# Patient Record
Sex: MALE | Race: WHITE | NOT HISPANIC OR LATINO | URBAN - METROPOLITAN AREA
[De-identification: names, ages, dates, MRNs, and addresses within clinical notes are randomized per-mention and may not be internally consistent; named-entity substitution may affect disease eponyms.]

---

## 2017-06-23 ENCOUNTER — HOSPITAL ENCOUNTER (INPATIENT)
Facility: HOSPITAL | Age: 27
LOS: 3 days | Discharge: HOME | End: 2017-06-26
Attending: INTERNAL MEDICINE | Admitting: INTERNAL MEDICINE

## 2017-06-23 ENCOUNTER — APPOINTMENT (OUTPATIENT)
Dept: GENERAL RADIOLOGY | Facility: HOSPITAL | Age: 27
End: 2017-06-23

## 2017-06-23 PROBLEM — N17.9 AKI (ACUTE KIDNEY INJURY) (HCC): Status: ACTIVE | Noted: 2017-06-23

## 2017-06-23 PROBLEM — I16.1 HYPERTENSIVE EMERGENCY: Status: ACTIVE | Noted: 2017-06-23

## 2017-06-23 LAB
ALBUMIN SERPL-MCNC: 4.1 G/DL (ref 3.2–4.8)
ALBUMIN/GLOB SERPL: 1.2 G/DL (ref 1.5–2.5)
ALP SERPL-CCNC: 66 U/L (ref 25–100)
ALT SERPL W P-5'-P-CCNC: 19 U/L (ref 7–40)
ANION GAP SERPL CALCULATED.3IONS-SCNC: 13 MMOL/L (ref 3–11)
ANION GAP SERPL CALCULATED.3IONS-SCNC: 13 MMOL/L (ref 3–11)
AST SERPL-CCNC: 23 U/L (ref 0–33)
BACTERIA UR QL AUTO: ABNORMAL /HPF
BASOPHILS # BLD AUTO: 0.02 10*3/MM3 (ref 0–0.2)
BASOPHILS # BLD AUTO: 0.02 10*3/MM3 (ref 0–0.2)
BASOPHILS NFR BLD AUTO: 0.1 % (ref 0–1)
BASOPHILS NFR BLD AUTO: 0.1 % (ref 0–1)
BILIRUB SERPL-MCNC: 0.6 MG/DL (ref 0.3–1.2)
BILIRUB UR QL STRIP: NEGATIVE
BUN BLD-MCNC: 21 MG/DL (ref 9–23)
BUN BLD-MCNC: 25 MG/DL (ref 9–23)
BUN/CREAT SERPL: 7.2 (ref 7–25)
BUN/CREAT SERPL: 8.9 (ref 7–25)
CALCIUM SPEC-SCNC: 8.7 MG/DL (ref 8.7–10.4)
CALCIUM SPEC-SCNC: 9.3 MG/DL (ref 8.7–10.4)
CHLORIDE SERPL-SCNC: 106 MMOL/L (ref 99–109)
CHLORIDE SERPL-SCNC: 108 MMOL/L (ref 99–109)
CK MB SERPL-CCNC: 1.7 NG/ML (ref 0–5)
CK SERPL-CCNC: 88 U/L (ref 26–174)
CLARITY UR: CLEAR
CO2 SERPL-SCNC: 18 MMOL/L (ref 20–31)
CO2 SERPL-SCNC: 20 MMOL/L (ref 20–31)
COLOR UR: YELLOW
CREAT BLD-MCNC: 2.8 MG/DL (ref 0.6–1.3)
CREAT BLD-MCNC: 2.9 MG/DL (ref 0.6–1.3)
DEPRECATED RDW RBC AUTO: 42.2 FL (ref 37–54)
DEPRECATED RDW RBC AUTO: 42.4 FL (ref 37–54)
EOSINOPHIL # BLD AUTO: 0 10*3/MM3 (ref 0.1–0.3)
EOSINOPHIL # BLD AUTO: 0.01 10*3/MM3 (ref 0.1–0.3)
EOSINOPHIL NFR BLD AUTO: 0 % (ref 0–3)
EOSINOPHIL NFR BLD AUTO: 0.1 % (ref 0–3)
ERYTHROCYTE [DISTWIDTH] IN BLOOD BY AUTOMATED COUNT: 13.7 % (ref 11.3–14.5)
ERYTHROCYTE [DISTWIDTH] IN BLOOD BY AUTOMATED COUNT: 13.7 % (ref 11.3–14.5)
GFR SERPL CREATININE-BSD FRML MDRD: 26 ML/MIN/1.73
GFR SERPL CREATININE-BSD FRML MDRD: 27 ML/MIN/1.73
GLOBULIN UR ELPH-MCNC: 3.4 GM/DL
GLUCOSE BLD-MCNC: 109 MG/DL (ref 70–100)
GLUCOSE BLD-MCNC: 159 MG/DL (ref 70–100)
GLUCOSE UR STRIP-MCNC: NEGATIVE MG/DL
HBA1C MFR BLD: 5.4 % (ref 4.8–5.6)
HCT VFR BLD AUTO: 43.3 % (ref 38.9–50.9)
HCT VFR BLD AUTO: 43.7 % (ref 38.9–50.9)
HGB BLD-MCNC: 14.7 G/DL (ref 13.1–17.5)
HGB BLD-MCNC: 15.4 G/DL (ref 13.1–17.5)
HGB UR QL STRIP.AUTO: ABNORMAL
HYALINE CASTS UR QL AUTO: ABNORMAL /LPF
IMM GRANULOCYTES # BLD: 0.03 10*3/MM3 (ref 0–0.03)
IMM GRANULOCYTES # BLD: 0.05 10*3/MM3 (ref 0–0.03)
IMM GRANULOCYTES NFR BLD: 0.2 % (ref 0–0.6)
IMM GRANULOCYTES NFR BLD: 0.3 % (ref 0–0.6)
KETONES UR QL STRIP: NEGATIVE
LEUKOCYTE ESTERASE UR QL STRIP.AUTO: NEGATIVE
LYMPHOCYTES # BLD AUTO: 0.78 10*3/MM3 (ref 0.6–4.8)
LYMPHOCYTES # BLD AUTO: 0.97 10*3/MM3 (ref 0.6–4.8)
LYMPHOCYTES NFR BLD AUTO: 4.9 % (ref 24–44)
LYMPHOCYTES NFR BLD AUTO: 6.9 % (ref 24–44)
MCH RBC QN AUTO: 28.7 PG (ref 27–31)
MCH RBC QN AUTO: 29.6 PG (ref 27–31)
MCHC RBC AUTO-ENTMCNC: 33.9 G/DL (ref 32–36)
MCHC RBC AUTO-ENTMCNC: 35.2 G/DL (ref 32–36)
MCV RBC AUTO: 83.9 FL (ref 80–99)
MCV RBC AUTO: 84.6 FL (ref 80–99)
MONOCYTES # BLD AUTO: 1.12 10*3/MM3 (ref 0–1)
MONOCYTES # BLD AUTO: 1.53 10*3/MM3 (ref 0–1)
MONOCYTES NFR BLD AUTO: 8 % (ref 0–12)
MONOCYTES NFR BLD AUTO: 9.6 % (ref 0–12)
MYOGLOBIN SERPL-MCNC: 96 NG/ML (ref 3–110)
NEUTROPHILS # BLD AUTO: 11.93 10*3/MM3 (ref 1.5–8.3)
NEUTROPHILS # BLD AUTO: 13.52 10*3/MM3 (ref 1.5–8.3)
NEUTROPHILS NFR BLD AUTO: 84.8 % (ref 41–71)
NEUTROPHILS NFR BLD AUTO: 85 % (ref 41–71)
NITRITE UR QL STRIP: NEGATIVE
PH UR STRIP.AUTO: 6 [PH] (ref 5–8)
PLATELET # BLD AUTO: 219 10*3/MM3 (ref 150–450)
PLATELET # BLD AUTO: 221 10*3/MM3 (ref 150–450)
PMV BLD AUTO: 9.7 FL (ref 6–12)
PMV BLD AUTO: 9.8 FL (ref 6–12)
POTASSIUM BLD-SCNC: 3 MMOL/L (ref 3.5–5.5)
POTASSIUM BLD-SCNC: 3.1 MMOL/L (ref 3.5–5.5)
PROT SERPL-MCNC: 7.5 G/DL (ref 5.7–8.2)
PROT UR QL STRIP: ABNORMAL
RBC # BLD AUTO: 5.12 10*6/MM3 (ref 4.2–5.76)
RBC # BLD AUTO: 5.21 10*6/MM3 (ref 4.2–5.76)
RBC # UR: ABNORMAL /HPF
REF LAB TEST METHOD: ABNORMAL
SODIUM BLD-SCNC: 137 MMOL/L (ref 132–146)
SODIUM BLD-SCNC: 141 MMOL/L (ref 132–146)
SP GR UR STRIP: 1.01 (ref 1–1.03)
SQUAMOUS #/AREA URNS HPF: ABNORMAL /HPF
TROPONIN I SERPL-MCNC: 0.07 NG/ML
TSH SERPL DL<=0.05 MIU/L-ACNC: 0.78 MIU/ML (ref 0.35–5.35)
UROBILINOGEN UR QL STRIP: ABNORMAL
WBC NRBC COR # BLD: 14.07 10*3/MM3 (ref 3.5–10.8)
WBC NRBC COR # BLD: 15.91 10*3/MM3 (ref 3.5–10.8)
WBC UR QL AUTO: ABNORMAL /HPF

## 2017-06-23 PROCEDURE — 83874 ASSAY OF MYOGLOBIN: CPT | Performed by: INTERNAL MEDICINE

## 2017-06-23 PROCEDURE — 85025 COMPLETE CBC W/AUTO DIFF WBC: CPT | Performed by: INTERNAL MEDICINE

## 2017-06-23 PROCEDURE — 25010000002 ENOXAPARIN PER 10 MG: Performed by: INTERNAL MEDICINE

## 2017-06-23 PROCEDURE — 81001 URINALYSIS AUTO W/SCOPE: CPT | Performed by: INTERNAL MEDICINE

## 2017-06-23 PROCEDURE — 71010 HC CHEST PA OR AP: CPT

## 2017-06-23 PROCEDURE — 25010000003 POTASSIUM CHLORIDE 10 MEQ/100ML SOLUTION: Performed by: INTERNAL MEDICINE

## 2017-06-23 PROCEDURE — 84484 ASSAY OF TROPONIN QUANT: CPT | Performed by: INTERNAL MEDICINE

## 2017-06-23 PROCEDURE — 99223 1ST HOSP IP/OBS HIGH 75: CPT | Performed by: INTERNAL MEDICINE

## 2017-06-23 PROCEDURE — 80053 COMPREHEN METABOLIC PANEL: CPT | Performed by: INTERNAL MEDICINE

## 2017-06-23 PROCEDURE — 93005 ELECTROCARDIOGRAM TRACING: CPT | Performed by: INTERNAL MEDICINE

## 2017-06-23 PROCEDURE — 25010000002 ONDANSETRON PER 1 MG: Performed by: INTERNAL MEDICINE

## 2017-06-23 PROCEDURE — 84443 ASSAY THYROID STIM HORMONE: CPT | Performed by: INTERNAL MEDICINE

## 2017-06-23 PROCEDURE — 93010 ELECTROCARDIOGRAM REPORT: CPT | Performed by: INTERNAL MEDICINE

## 2017-06-23 PROCEDURE — 82553 CREATINE MB FRACTION: CPT | Performed by: INTERNAL MEDICINE

## 2017-06-23 PROCEDURE — 25010000002 PROMETHAZINE PER 50 MG: Performed by: INTERNAL MEDICINE

## 2017-06-23 PROCEDURE — 83036 HEMOGLOBIN GLYCOSYLATED A1C: CPT | Performed by: INTERNAL MEDICINE

## 2017-06-23 PROCEDURE — 82550 ASSAY OF CK (CPK): CPT | Performed by: INTERNAL MEDICINE

## 2017-06-23 PROCEDURE — 25810000003 DEXTROSE-NACL PER 500 ML: Performed by: INTERNAL MEDICINE

## 2017-06-23 RX ORDER — POTASSIUM CHLORIDE 7.45 MG/ML
10 INJECTION INTRAVENOUS
Status: DISPENSED | OUTPATIENT
Start: 2017-06-23 | End: 2017-06-23

## 2017-06-23 RX ORDER — POTASSIUM CHLORIDE 750 MG/1
40 CAPSULE, EXTENDED RELEASE ORAL AS NEEDED
Status: DISCONTINUED | OUTPATIENT
Start: 2017-06-23 | End: 2017-06-26 | Stop reason: HOSPADM

## 2017-06-23 RX ORDER — DEXTROSE AND SODIUM CHLORIDE 5; .9 G/100ML; G/100ML
100 INJECTION, SOLUTION INTRAVENOUS CONTINUOUS
Status: DISCONTINUED | OUTPATIENT
Start: 2017-06-23 | End: 2017-06-24

## 2017-06-23 RX ORDER — ONDANSETRON 2 MG/ML
4 INJECTION INTRAMUSCULAR; INTRAVENOUS EVERY 6 HOURS PRN
Status: DISCONTINUED | OUTPATIENT
Start: 2017-06-23 | End: 2017-06-26 | Stop reason: HOSPADM

## 2017-06-23 RX ORDER — SODIUM CHLORIDE 0.9 % (FLUSH) 0.9 %
1-10 SYRINGE (ML) INJECTION AS NEEDED
Status: DISCONTINUED | OUTPATIENT
Start: 2017-06-23 | End: 2017-06-26 | Stop reason: HOSPADM

## 2017-06-23 RX ORDER — CLONIDINE HYDROCHLORIDE 0.2 MG/1
0.2 TABLET ORAL EVERY 12 HOURS SCHEDULED
Status: DISCONTINUED | OUTPATIENT
Start: 2017-06-23 | End: 2017-06-24

## 2017-06-23 RX ORDER — POTASSIUM CHLORIDE 1.5 G/1.77G
40 POWDER, FOR SOLUTION ORAL AS NEEDED
Status: DISCONTINUED | OUTPATIENT
Start: 2017-06-23 | End: 2017-06-26 | Stop reason: HOSPADM

## 2017-06-23 RX ORDER — PROMETHAZINE HYDROCHLORIDE 25 MG/ML
12.5 INJECTION, SOLUTION INTRAMUSCULAR; INTRAVENOUS EVERY 4 HOURS PRN
Status: DISCONTINUED | OUTPATIENT
Start: 2017-06-23 | End: 2017-06-26 | Stop reason: HOSPADM

## 2017-06-23 RX ORDER — ACETAMINOPHEN 325 MG/1
650 TABLET ORAL EVERY 4 HOURS PRN
Status: DISCONTINUED | OUTPATIENT
Start: 2017-06-23 | End: 2017-06-26 | Stop reason: HOSPADM

## 2017-06-23 RX ORDER — CARVEDILOL 12.5 MG/1
25 TABLET ORAL EVERY 12 HOURS SCHEDULED
Status: DISCONTINUED | OUTPATIENT
Start: 2017-06-23 | End: 2017-06-26 | Stop reason: HOSPADM

## 2017-06-23 RX ADMIN — CARVEDILOL 25 MG: 12.5 TABLET, FILM COATED ORAL at 13:58

## 2017-06-23 RX ADMIN — PROMETHAZINE HYDROCHLORIDE 12.5 MG: 25 INJECTION INTRAMUSCULAR; INTRAVENOUS at 08:29

## 2017-06-23 RX ADMIN — CLONIDINE HYDROCHLORIDE 0.2 MG: 0.2 TABLET ORAL at 13:58

## 2017-06-23 RX ADMIN — CARVEDILOL 25 MG: 12.5 TABLET, FILM COATED ORAL at 21:19

## 2017-06-23 RX ADMIN — POTASSIUM CHLORIDE 10 MEQ: 7.46 INJECTION, SOLUTION INTRAVENOUS at 15:47

## 2017-06-23 RX ADMIN — ONDANSETRON 4 MG: 2 INJECTION INTRAMUSCULAR; INTRAVENOUS at 10:47

## 2017-06-23 RX ADMIN — POTASSIUM CHLORIDE 40 MEQ: 750 CAPSULE, EXTENDED RELEASE ORAL at 21:19

## 2017-06-23 RX ADMIN — CLONIDINE HYDROCHLORIDE 0.2 MG: 0.2 TABLET ORAL at 21:19

## 2017-06-23 RX ADMIN — POTASSIUM CHLORIDE 40 MEQ: 750 CAPSULE, EXTENDED RELEASE ORAL at 16:10

## 2017-06-23 RX ADMIN — DEXTROSE AND SODIUM CHLORIDE 100 ML/HR: 5; 900 INJECTION, SOLUTION INTRAVENOUS at 13:57

## 2017-06-23 RX ADMIN — ENOXAPARIN SODIUM 40 MG: 40 INJECTION SUBCUTANEOUS at 14:01

## 2017-06-23 RX ADMIN — NICARDIPINE HYDROCHLORIDE 5 MG/HR: 25 INJECTION INTRAVENOUS at 08:11

## 2017-06-23 RX ADMIN — NICARDIPINE HYDROCHLORIDE 5 MG/HR: 25 INJECTION INTRAVENOUS at 13:48

## 2017-06-23 NOTE — PLAN OF CARE
Problem: Hypertensive Disease/Crisis (Arterial) (Adult)  Goal: Signs and Symptoms of Listed Potential Problems Will be Absent or Manageable (Hypertensive Disease/Crisis)  Outcome: Ongoing (interventions implemented as appropriate)

## 2017-06-23 NOTE — H&P
Chief complaint:  Nausea and headache    Subjective     Patient is a 27 y.o. male with a long-standing history of high blood pressure.  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  He was admitted here previously for elevated cardiac enzymes but had near normal coronary arteries but was noted to have LVH.  It sounds as if in the past he has run out of blood pressure medications and has become symptomatic.    He presented to the emergency department in [place] on the 21st simply for medication refill.  He just moved there from [place] and had run out of his blood pressure medications.  He was found to have an elevation of his creatinine to 2.5.  He had an appointment with Dr. Herron the following day.  He was given hydralazine and labetalol.  Historically he has been treated with clonidine, Coreg, and lisinopril.  He could not get the labetalol filled.  He presented again to the emergency department early this morning with 2 days of nausea and headache.  He was found to have continued elevation of his creatinine to 3.4.  He had a CT of the abdomen which showed some colon wall thickening suggestive of colitis.  However, the patient denies much in the way of lower bowel symptoms.  He says he has had an occasional loose stool over the last several days but had no concerns in that regard.  He says his symptoms of nausea and headache are typical of his blood pressure elevation.    He has noted no systemic symptoms such as fevers, chills, or malaise.  He denies any chest pain.  He was found to have red blood cells in his urine as well.  He denies any urinary symptoms.  His recent drug screen has been positive for THC but no other illicit substances.      History  Past Medical History:   Diagnosis Date   • Anxiety    • HTN (hypertension)    • Leukemia      Past Surgical History:   Procedure Laterality Date   • CARDIAC CATHETERIZATION N/A 12/22/2016    Procedure: Left Heart Cath;  Surgeon: Ning  MD Lydia;  Location: [facility] CATH INVASIVE LOCATION;  Service:    • PORTACATH PLACEMENT       Family History   Problem Relation Age of Onset   • Coronary artery disease Father    • Heart attack Father      x5     Social History   Substance Use Topics   • Smoking status: Current Every Day Smoker     Packs/day: 0.50     Years: 12.00     Types: Cigarettes   • Smokeless tobacco: Never Used   • Alcohol use No     Prescriptions Prior to Admission   Medication Sig Dispense Refill Last Dose   • amLODIPine (NORVASC) 5 MG tablet Take 1 tablet by mouth Daily. 90 tablet 3    • carvedilol (COREG) 25 MG tablet Take 1 tablet by mouth Every 12 (Twelve) Hours. 180 tablet 3    • CloNIDine (CATAPRES) 0.2 MG tablet Take 0.2 mg by mouth 2 (Two) Times a Day.   Past Week at Unknown time   • escitalopram (LEXAPRO) 10 MG tablet Take 10 mg by mouth Daily.   Past Week at Unknown time   • lisinopril (PRINIVIL,ZESTRIL) 20 MG tablet Take 1 tablet by mouth Every 12 (Twelve) Hours. 90 tablet 3    • nicotine (NICODERM CQ) 21 MG/24HR patch Place 1 patch on the skin Daily. 30 patch 0      Allergies:  Review of patient's allergies indicates no known allergies.    Review of Systems   Pertinent items are noted in HPI, all other systems reviewed and negative      Objective     Vital Signs  Heart Rate:  [] 106  Resp:  [20] 20  BP: (158-183)/(111-121) 176/116    Physical Exam:    Objective:  General Appearance:  In no acute distress.    Vital signs: (most recent): Blood pressure (!) 176/116, pulse 106, resp. rate 20, SpO2 94 %.    HEENT: Normal HEENT exam.    Lungs:  Normal respiratory rate and normal effort.  He is not in respiratory distress.  Breath sounds clear to auscultation.  No wheezes, rales or rhonchi.    Heart: Normal rate.  Regular rhythm.  S1 normal and S2 normal.  No murmur, gallop or friction rub.   Chest: Symmetric chest wall expansion.   Abdomen: Abdomen is soft and non-distended.  Bowel sounds are normal.   There is no abdominal  tenderness.   There is no mass. There is no splenomegaly. There is no hepatomegaly.   Extremities: There is no deformity or dependent edema.    Neurological: Patient is alert and oriented to person, place and time.    Pupils:  Pupils are equal, round, and reactive to light.    Skin:  Warm and dry.              Results Review:    I reviewed the patient's new clinical results.  I reviewed the patient's new imaging results and agree with the interpretation.  I reviewed the patient's other test results and agree with the interpretation  I personally viewed and interpreted the patient's EKG/Telemetry data    Assessment/Plan     Assessment:    Hospital Problem List     Hypertensive emergency    RONALD (acute kidney injury)    Anxiety    Tobacco abuse          He has elevated blood pressure and symptoms of headache and nausea consistent with a hypertensive emergency.  Will lower blood pressure by about 10-20% initially and try to keep systolic in the 160-180 range in the first 24 hours and then will lower it gradually further from that point assuming his symptoms resolved.  He may have some dehydration due to his nausea and vomiting and poor by mouth intake so will carefully replace potassium and give additional IV fluids and follow-up his renal labs.    Plan:    -ICU admission  -Cardene drip with initial systolic blood pressure goal 160-180  -IV hydration  -Replace potassium  -Discussed the situation with the patient.  Understands the need to not run out of his blood pressure medications as he has had coronary ischemia in the past and now appears to have acute renal insufficiency.    I discussed the patients findings and my recommendations with patient and nursing staff.     Jose Pierce MD  Pulmonary and Critical Care Medicine  06/23/17  2:30 PM

## 2017-06-23 NOTE — PROGRESS NOTES
"Adult Nutrition  Assessment/PES    Patient Name:  Shay Serrano  YOB: 1990  MRN: [id number]  Admit Date:  6/23/2017    Assessment Date:  6/23/2017           Reason for Assessment       06/23/17 1534    Reason for Assessment    Reason For Assessment/Visit multidisciplinary rounds;identified at risk by screening criteria    Identified At Risk By Screening Criteria MST SCORE 2+    Time Spent (min) 20    Diagnosis Diagnosis    Cardiac HTN   emergency    Gastrointestinal Nausea;Colitis    Renal RONALD    Substance Use Tobacco   PMH: He  has a past medical history of Anxiety; HTN (hypertension); and Leukemia.   PSxH: He  has a past surgical history that includes Portacath placement and Cardiac catheterization (N/A, 12/22/2016).              Nutrition/Diet History       06/23/17 1535    Nutrition/Diet History    Reported/Observed By RN   pt sleeping at time of RD visit, no family present    Other RN reports pt states that his UBW= 178#, however pt is not sure when he last weighed 178#. Noted per EMR (12/22/16) that pt weighed 178#. RN states that pt told her that he had not eaten well the past couple days prior to adm. RN reports that pt tolerated lunch tray.             Anthropometrics       06/23/17 1537    Anthropometrics    Height 175.3 cm (69\")    Weight 76.2 kg (168 lb)    Ideal Body Weight (IBW)    Ideal Body Weight (IBW), Male (kg) 73.69    % Ideal Body Weight 103.63    Usual Body Weight (UBW)    Usual Body Weight 80.7 kg (178 lb)   per RN from pt    % Usual Body Weight 94.38    Weight Loss 4.536 kg (10 lb)    Weight Loss Time Frame 6 months    Body Mass Index (BMI)    BMI (kg/m2) 24.86            Labs/Tests/Procedures/Meds       06/23/17 1537    Labs/Tests/Procedures/Meds    Labs/Tests Review Reviewed;Creat;BUN;K+   replacing K+    Medication Review Reviewed, pertinent     Results from last 7 days  Lab Units 06/23/17  1433 06/23/17  0909   SODIUM mmol/L 137 141   POTASSIUM mmol/L 3.0* 3.1*   CHLORIDE " mmol/L 106 108   TOTAL CO2 mmol/L 18.0* 20.0   BUN mg/dL 21 25*   CREATININE mg/dL 2.90* 2.80*   CALCIUM mg/dL 9.3 8.7   BILIRUBIN mg/dL  --  0.6   ALK PHOS U/L  --  66   ALT (SGPT) U/L  --  19   AST (SGOT) U/L  --  23   GLUCOSE mg/dL 159* 109*                Nutrition Prescription Ordered       06/23/17 1538    Nutrition Prescription PO    Current PO Diet Regular            Evaluation of Received Nutrient/Fluid Intake       06/23/17 1538    PO Evaluation    Number of Days PO Intake Evaluated Insufficient Data    Number of Meals 1    % PO Intake 25              Problem/Interventions:        Problem 1       06/23/17 1539    Nutrition Diagnoses Problem 1    Problem 1 Inadequate Intake/Infusion    Inadequate Intake Type Oral    Etiology (related to) --   clinical condition    Signs/Symptoms (evidenced by) Report of Mnimal PO Intake                    Intervention Goal       06/23/17 1540    Intervention Goal    General Nutrition support treatment    PO Establish PO;Tolerate PO            Nutrition Intervention       06/23/17 1540    Nutrition Intervention    RD/Tech Action Follow Tx progress;Care plan reviewd;Menu provided              Education/Evaluation       06/23/17 1540    Monitor/Evaluation    Monitor Per protocol;PO intake;Pertinent labs;Symptoms          Electronically signed by:  Mahogany Lux MS RD/LD CNSC  06/23/17 3:40 PM

## 2017-06-23 NOTE — PROGRESS NOTES
Multidisciplinary Rounds    Time: 20min  Patient Name: Shay Serrano  Date of Encounter: 06/23/17 2:17 PM  MRN: [id number]  Admission date: 6/23/2017      Reason for visit: MDR. RD to continue to follow per protocol.       Current diet: Diet Regular      Intervention:  Follow treatment plan  Care plan reviewed    Follow up:   Per protocol      Mahogany Lux MS RD/LD CNSC  2:17 PM

## 2017-06-23 NOTE — NURSING NOTE
"ACC REVIEW REPORT: [facility]        PATIENT NAME: Shay Serrano    PATIENT ID: [id number]    BED: N211    BED TYPE: ICU    BED GIVEN TO: JAREN WEST RN    TIME BED GIVEN:  0535    YOB: 1990    AGE:27Y    GENDER:M    PREVIOUS ADMIT TO [facility]:Y    PREVIOUS ADMISSION DATE:12/22/16    PATIENT CLASS:INPATIENT    TODAY'S DATE: 6/23/2017    TRANSFER DATE: 6/23/17    ETA:    TRANSFERRING FACILITY:[facility]    TRANSFERRING FACILITY PHONE # :[phone]    TRANSFERRING MD:MADDY    DATE/TIME REQUEST RECEIVED:6/23/17 0203    [facility] RN: DESMOND AUGUSTIN RN    REPORT FROM:JAREN WEST RN    TIME REPORT TAKEN:0535    DIAGNOSIS:RONALD, COLITIS, HYPERTENSION    REASON FOR TRANSFER TO [facility]:HIGHER LEVEL OF CARE    TRANSPORTATION:AMBULANCE    CLINICAL REASON FOR TRANSFER TO [facility]: PATIENT CAME TO ER WITH C/O HTN, ABDOMINAL PAIN.  WAS SEEN 6/21 WITH SAME SYMPTOMS BUT HAD BEEN \"UNABLE\" TO GET THE MEDS THAT THE ER HAD GIVEN HIM.  BP WAS MORE ELEVATED AND HIS RENAL FUNCTION HAD DECLINED.      CLINICAL INFORMATION    HEIGHT:     WEIGHT:    ALLERGIES:NKDA    BENZ:NO    INFECTIOUS DISEASE:N    ISOLATION:N    1ST VITAL SIGNS:   TIME:1900  TEMP:  PULSE:  B/P:226/132  RESP:     LAST VITAL SIGNS:  TIME:0530  TEMP:98.4  PULSE:117  B/P:145/78  RESP:16    LAB INFORMATION: ON THE 21ST HIS WBC WAS 21, BUN 25, AND CRT 2.4, 6/22/17 AT 1900 BUN WAS 30, CRT 3.4, THIS MORNING AFTER 4 L OF FLUID HIS WBC 13, BUN 28, CRT 3.28    CULTURE INFORMATION:    MEDS/IV FLUIDS:#18 RAC, #20LAC PATIENT HAS CARDENE AT 7.5 MG/HR, LABETOLOL 10 MG IV, 12.5 MG HYDRALAZINE IV, 10 MG REGLAN X 2, 25 MG IV, 500 MG FLAGYL IV, 500 MG LEVAQUIN IV, 1 MG IV, ZOFRAN 4 MG IV, AND PHENERGAN 12.5 MG.      CARDIAC SYSTEM:    CHEST PAIN:N    RATE:0    SCALE:1/10    RHYTHM:SINUS TACH    Is patient taking or has patient been given any drugs that could increase bleeding?   (Plavix, Brilinta, Effient, Eliquis, Xarelto, Warfarin, Integrilin, " "Angiomax)    DRUG: NO    DOSE/FREQUENCY:      CARDIAC NOTES: WAS SEEN 6/21 FOR UNCONTROLLED HTN AND WAS PRESCRIBED MEDS WHICH HE WAS \"UNABLE\" TO GET.      RESPIRATORY SYSTEM:    LUNG SOUNDS:    CLEAR:Y  CRACKLES:N  WHEEZES:N  RHONCHI:N  DIMINISHED:N    ABG DATE: N/A        ABG TIME: N/A    ABG RESULTS:N/A    PH:  PO2:  PCO2:  HCO3:  O2 SAT:      ETT:N/A    ETT SIZE:N/A    ORAL:N/A    NASAL:N/A    SECURED AT MEASUREMENT (CM):N/A    ON VENTILATOR:N/A    TV:  FI02:  RATE:  PEEP:    OXYGEN:N    O2 SAT:96    ADMINISTRATION ROUTE:ROOM AIR    IMAGING FINDINGS:N/A    PNEUMO LOCATION: N/A    PNEUMO SIZE:N/A    PNEUMO CHEST TUBE SEAL TYPE:N/A    RADIOLOGY RESULTS:N/A    RESPIRATORY STATUS:N/A      CNS/MUSCULOSKELETAL    ALERT AND ORIENTED:    PERSON:Y  PLACE:Y  TIME:Y    INJURY:  WHERE:NO    TRUPTI COMA SCALE:  E:4  M:6  V:5    STROKE SCALE:N/A    SIZE OF HEMORRHAGE?:N/A    SYMPTOMS: (CHOOSE APPROPRIATE) NONE    ASPHASIA:  ATAXIA:  DYSARTHRIA:  DYSPHASIA:  HEADACHE:  PARALYSIS:  SEIZURE:  SYNCOPE:  VERTIGO:  VISION CHANGE:       EXTREMITY WEAKNESS:    LEFT ARM:N  RIGHT ARM:N  LEFT LEG:N  RIGHT LEG:N    CAT SCAN RESULTS: OF HEAD FROM 6/21/17 WNL    MRI RESULTS:N    CNS/MUSCULOSKELETAL NOTES:      GI//GY      ABDOMINAL PAIN:Y    VOMITING:N    DIARRHEA:N    NAUSEA:Y    BOWEL SOUNDS:HYPERACTIVE    OCCULT STOOL:UNKNOWN    VAGINAL BLEEDING:N/A    TESTICULAR PAIN:N    HEMATURIA:N      NG TUBE:N/A  SIZE:  DATE INSERTED:    ULTRASOUND:N  ULTRASOUND RESULTS:N/A      ACUTE ABDOMEN:N  ACUTE ABDOMEN RESULTS:N/A      CT SCAN:Y  CT SCAN RESULTS:TRANSVERSE AND ASCENDING COLITIS       GI//GY NOTES:    PAST MEDICAL HISTORY: SMOKER, ABDOMINAL BIOPSY, HTN, ANXIETY, NSTEMI      OTHER SYMPTOM NOTES:    ADDITIONAL NOTES:  INFORMED NURSE PATIENT WAS TO BE TRANSFERRED SO HE WOULD ARRIVE AFTER 7 AM.            Carmen Jang RN  6/23/2017  5:47 AM        "

## 2017-06-23 NOTE — PROGRESS NOTES
Discharge Planning Assessment  [facility]     Patient Name: Shay Serrano  MRN: [id number]  Today's Date: 6/23/2017    Admit Date: 6/23/2017          Discharge Needs Assessment       06/23/17 1139    Living Environment    Lives With sibling(s)    Living Arrangements apartment    Home Accessibility no concerns    Stair Railings at Home inside, present on left side    Type of Financial/Environmental Concern none    Transportation Available family or friend will provide    Living Environment    Provides Primary Care For no one    Quality Of Family Relationships supportive    Able to Return to Prior Living Arrangements yes    Discharge Needs Assessment    Concerns To Be Addressed no discharge needs identified    Readmission Within The Last 30 Days no previous admission in last 30 days    Anticipated Changes Related to Illness none    Equipment Currently Used at Home none    Equipment Needed After Discharge none            Discharge Plan       06/23/17 1140    Case Management/Social Work Plan    Plan Home    Patient/Family In Agreement With Plan yes    Additional Comments Spoke with patient at bedside.  Patient resides in [place] and lives in an apartment.  Prior to admission patient was independent with ADL's and mobility.  Patient does not have any DME and has never used home health.  Patient denies any discharge needs at this time.  CM will continue to follow.        Discharge Placement     No information found        Expected Discharge Date and Time     Expected Discharge Date Expected Discharge Time    Jun 27, 2017               Demographic Summary       06/23/17 1138    Referral Information    Admission Type inpatient    Arrived From home or self-care    Referral Source admission list    Reason For Consult discharge planning    Record Reviewed clinical discipline documentation;history and physical;medical record;patient profile    Contact Information    Permission Granted to Share Information With case  manager;family/designee    Primary Care Physician Information    Name Shannon Garcia            Functional Status     None            Psychosocial     None            Abuse/Neglect     None            Legal     None            Substance Abuse     None            Patient Forms     None          Traci Dunbar RN

## 2017-06-23 NOTE — PAYOR COMM NOTE
"Spring Goncalves RN Utilization Review [phone]  Fax # [phone]    Notification of admission and initial clinicals   Status is inpatient as of 06/23/17 @ 4749. Spring Goncalves RN  Antonieta Serrano (27 y.o. Male)     Date of Birth Social Security Number Address Home Phone MRN    1990  [address]  [place] [id number] [phone] [id number]    Jewish Marital Status          None Single       Admission Date Admission Type Admitting Provider Attending Provider Department, Room/Bed    6/23/17 Elective Pattie Lira MD Gerhardstein, Donna C, MD [facility] 2A ICU, N211/1    Discharge Date Discharge Disposition Discharge Destination                      Attending Provider: Pattie Lira MD     Allergies:  No Known Allergies    Isolation:  None   Infection:  None   Code Status:  FULL    Ht:  67\" (170.2 cm)   Wt:  --    Admission Cmt:  None   Principal Problem:  None                Active Insurance as of 6/23/2017     Primary Coverage     Payor Plan Insurance Group Employer/Plan Group    [employer] MEDICAID [employer] [facility]     Payor Plan Address Payor Plan Phone Number Effective From Effective To    PO  [phone] 12/22/2016     [place] [address]       Subscriber Name Subscriber Birth Date Member ID       ANTONIETA SERRANO 1990 [id number]                 Emergency Contacts      (Rel.) Home Phone Work Phone Mobile Phone    [facility] -- -- [phone]    Rosetta Serrano (Grandparent) [phone] -- --    Zev Villalta (Significant Other) [phone] -- --               History & Physical      Jose Pierce MD at 6/23/2017  2:30 PM                Chief complaint:  Nausea and headache    Subjective     Patient is a 27 y.o. male with a long-standing history of high blood pressure.  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  He was admitted here previously for elevated cardiac enzymes but had near normal coronary arteries " but was noted to have LVH.  It sounds as if in the past he has run out of blood pressure medications and has become symptomatic.    He presented to the emergency department in [place] on the 21st simply for medication refill.  He just moved there from [place] and had run out of his blood pressure medications.  He was found to have an elevation of his creatinine to 2.5.  He had an appointment with Dr. Herron the following day.  He was given hydralazine and labetalol.  Historically he has been treated with clonidine, Coreg, and lisinopril.  He could not get the labetalol filled.  He presented again to the emergency department early this morning with 2 days of nausea and headache.  He was found to have continued elevation of his creatinine to 3.4.  He had a CT of the abdomen which showed some colon wall thickening suggestive of colitis.  However, the patient denies much in the way of lower bowel symptoms.  He says he has had an occasional loose stool over the last several days but had no concerns in that regard.  He says his symptoms of nausea and headache are typical of his blood pressure elevation.    He has noted no systemic symptoms such as fevers, chills, or malaise.  He denies any chest pain.  He was found to have red blood cells in his urine as well.  He denies any urinary symptoms.  His recent drug screen has been positive for THC but no other illicit substances.      History  Past Medical History:   Diagnosis Date   • Anxiety    • HTN (hypertension)    • Leukemia      Past Surgical History:   Procedure Laterality Date   • CARDIAC CATHETERIZATION N/A 12/22/2016    Procedure: Left Heart Cath;  Surgeon: Ning Freeman MD;  [facility];  Service:    • PORTACATH PLACEMENT       Family History   Problem Relation Age of Onset   • Coronary artery disease Father    • Heart attack Father      x5     Social History   Substance Use Topics   • Smoking status: Current Every Day Smoker      Packs/day: 0.50     Years: 12.00     Types: Cigarettes   • Smokeless tobacco: Never Used   • Alcohol use No     Prescriptions Prior to Admission   Medication Sig Dispense Refill Last Dose   • amLODIPine (NORVASC) 5 MG tablet Take 1 tablet by mouth Daily. 90 tablet 3    • carvedilol (COREG) 25 MG tablet Take 1 tablet by mouth Every 12 (Twelve) Hours. 180 tablet 3    • CloNIDine (CATAPRES) 0.2 MG tablet Take 0.2 mg by mouth 2 (Two) Times a Day.   Past Week at Unknown time   • escitalopram (LEXAPRO) 10 MG tablet Take 10 mg by mouth Daily.   Past Week at Unknown time   • lisinopril (PRINIVIL,ZESTRIL) 20 MG tablet Take 1 tablet by mouth Every 12 (Twelve) Hours. 90 tablet 3    • nicotine (NICODERM CQ) 21 MG/24HR patch Place 1 patch on the skin Daily. 30 patch 0      Allergies:  Review of patient's allergies indicates no known allergies.    Review of Systems   Pertinent items are noted in HPI, all other systems reviewed and negative      Objective     Vital Signs  Heart Rate:  [] 106  Resp:  [20] 20  BP: (158-183)/(111-121) 176/116    Physical Exam:    Objective:  General Appearance:  In no acute distress.    Vital signs: (most recent): Blood pressure (!) 176/116, pulse 106, resp. rate 20, SpO2 94 %.    HEENT: Normal HEENT exam.    Lungs:  Normal respiratory rate and normal effort.  He is not in respiratory distress.  Breath sounds clear to auscultation.  No wheezes, rales or rhonchi.    Heart: Normal rate.  Regular rhythm.  S1 normal and S2 normal.  No murmur, gallop or friction rub.   Chest: Symmetric chest wall expansion.   Abdomen: Abdomen is soft and non-distended.  Bowel sounds are normal.   There is no abdominal tenderness.   There is no mass. There is no splenomegaly. There is no hepatomegaly.   Extremities: There is no deformity or dependent edema.    Neurological: Patient is alert and oriented to person, place and time.    Pupils:  Pupils are equal, round, and reactive to light.    Skin:  Warm and dry.               Results Review:    I reviewed the patient's new clinical results.  I reviewed the patient's new imaging results and agree with the interpretation.  I reviewed the patient's other test results and agree with the interpretation  I personally viewed and interpreted the patient's EKG/Telemetry data    Assessment/Plan     Assessment:    Hospital Problem List     Hypertensive emergency    RONALD (acute kidney injury)    Anxiety    Tobacco abuse          He has elevated blood pressure and symptoms of headache and nausea consistent with a hypertensive emergency.  Will lower blood pressure by about 10-20% initially and try to keep systolic in the 160-180 range in the first 24 hours and then will lower it gradually further from that point assuming his symptoms resolved.  He may have some dehydration due to his nausea and vomiting and poor by mouth intake so will carefully replace potassium and give additional IV fluids and follow-up his renal labs.    Plan:    -ICU admission  -Cardene drip with initial systolic blood pressure goal 160-180  -IV hydration  -Replace potassium  -Discussed the situation with the patient.  Understands the need to not run out of his blood pressure medications as he has had coronary ischemia in the past and now appears to have acute renal insufficiency.    I discussed the patients findings and my recommendations with patient and nursing staff.     Jose Pierce MD  Pulmonary and Critical Care Medicine  06/23/17  2:30 PM           Electronically signed by Jose Pierce MD at 6/23/2017  2:44 PM        Emergency Department Notes     No notes of this type exist for this encounter.        Hospital Medications (active)       Dose Frequency Start End    acetaminophen (TYLENOL) tablet 650 mg 650 mg Every 4 Hours PRN 6/23/2017     Sig - Route: Take 2 tablets by mouth Every 4 (Four) Hours As Needed for Headache or Fever (temperature greater than 101.5 F). - Oral    carvedilol  (COREG) tablet 25 mg 25 mg Every 12 Hours Scheduled 6/23/2017     Sig - Route: Take 2 tablets by mouth Every 12 (Twelve) Hours. - Oral    CloNIDine (CATAPRES) tablet 0.2 mg 0.2 mg Every 12 Hours Scheduled 6/23/2017     Sig - Route: Take 1 tablet by mouth Every 12 (Twelve) Hours. - Oral    dextrose 5 % and sodium chloride 0.9 % infusion 100 mL/hr Continuous 6/23/2017     Sig - Route: Infuse 100 mL/hr into a venous catheter Continuous. - Intravenous    enoxaparin (LOVENOX) syringe 40 mg 40 mg Daily 6/23/2017     Sig - Route: Inject 0.4 mL under the skin Daily. - Subcutaneous    niCARdipine (CARDENE) 25 mg/250 mL (0.1 mg/mL) 0.9% NS VTB infusion 5-15 mg/hr Titrated 6/23/2017     Sig - Route: Infuse 5-15 mg/hr into a venous catheter Dose Adjusted By Provider As Needed. - Intravenous    ondansetron (ZOFRAN) injection 4 mg 4 mg Every 6 Hours PRN 6/23/2017     Sig - Route: Infuse 2 mL into a venous catheter Every 6 (Six) Hours As Needed for Nausea or Vomiting. - Intravenous    potassium chloride 10 mEq in 100 mL IVPB 10 mEq Every 1 Hour 6/23/2017 6/23/2017    Sig - Route: Infuse 100 mL into a venous catheter Every 1 (One) Hour. - Intravenous    promethazine (PHENERGAN) injection 12.5 mg 12.5 mg Every 4 Hours PRN 6/23/2017     Sig - Route: Infuse 0.5 mL into a venous catheter Every 4 (Four) Hours As Needed for Nausea or Vomiting. - Intravenous    sodium chloride 0.9 % flush 1-10 mL 1-10 mL As Needed 6/23/2017     Sig - Route: Infuse 1-10 mL into a venous catheter As Needed for Line Care. - Intravenous    enoxaparin (LOVENOX) syringe 40 mg (Discontinued) 40 mg Daily 6/23/2017 6/23/2017    Sig - Route: Inject 0.4 mL under the skin Daily. - Subcutaneous          Physician Progress Notes (all)     No notes of this type exist for this encounter.        Consult Notes (all)     No notes of this type exist for this encounter.

## 2017-06-24 LAB
ANION GAP SERPL CALCULATED.3IONS-SCNC: 9 MMOL/L (ref 3–11)
BASOPHILS # BLD AUTO: 0.04 10*3/MM3 (ref 0–0.2)
BASOPHILS NFR BLD AUTO: 0.6 % (ref 0–1)
BUN BLD-MCNC: 30 MG/DL (ref 9–23)
BUN/CREAT SERPL: 7.5 (ref 7–25)
CALCIUM SPEC-SCNC: 7.9 MG/DL (ref 8.7–10.4)
CHLORIDE SERPL-SCNC: 106 MMOL/L (ref 99–109)
CO2 SERPL-SCNC: 23 MMOL/L (ref 20–31)
CREAT BLD-MCNC: 4 MG/DL (ref 0.6–1.3)
CREAT UR-MCNC: 77.6 MG/DL
DEPRECATED RDW RBC AUTO: 44.4 FL (ref 37–54)
EOSINOPHIL # BLD AUTO: 0.1 10*3/MM3 (ref 0.1–0.3)
EOSINOPHIL NFR BLD AUTO: 1.5 % (ref 0–3)
EOSINOPHIL SPEC QL MICRO: 2 % EOS/100 CELLS (ref 0–0)
ERYTHROCYTE [DISTWIDTH] IN BLOOD BY AUTOMATED COUNT: 14 % (ref 11.3–14.5)
GFR SERPL CREATININE-BSD FRML MDRD: 18 ML/MIN/1.73
GLUCOSE BLD-MCNC: 102 MG/DL (ref 70–100)
HCT VFR BLD AUTO: 35.7 % (ref 38.9–50.9)
HGB BLD-MCNC: 11.6 G/DL (ref 13.1–17.5)
IMM GRANULOCYTES # BLD: 0.02 10*3/MM3 (ref 0–0.03)
IMM GRANULOCYTES NFR BLD: 0.3 % (ref 0–0.6)
LYMPHOCYTES # BLD AUTO: 1.74 10*3/MM3 (ref 0.6–4.8)
LYMPHOCYTES NFR BLD AUTO: 25.9 % (ref 24–44)
MAGNESIUM SERPL-MCNC: 1.6 MG/DL (ref 1.3–2.7)
MCH RBC QN AUTO: 28 PG (ref 27–31)
MCHC RBC AUTO-ENTMCNC: 32.5 G/DL (ref 32–36)
MCV RBC AUTO: 86 FL (ref 80–99)
MONOCYTES # BLD AUTO: 1.23 10*3/MM3 (ref 0–1)
MONOCYTES NFR BLD AUTO: 18.3 % (ref 0–12)
NEUTROPHILS # BLD AUTO: 3.59 10*3/MM3 (ref 1.5–8.3)
NEUTROPHILS NFR BLD AUTO: 53.4 % (ref 41–71)
NRBC BLD MANUAL-RTO: 0 /100 WBC (ref 0–0)
PHOSPHATE SERPL-MCNC: 3.2 MG/DL (ref 2.4–5.1)
PLATELET # BLD AUTO: 198 10*3/MM3 (ref 150–450)
PMV BLD AUTO: 10.2 FL (ref 6–12)
POTASSIUM BLD-SCNC: 3.8 MMOL/L (ref 3.5–5.5)
PROT UR-MCNC: 54 MG/DL (ref 1–14)
RBC # BLD AUTO: 4.15 10*6/MM3 (ref 4.2–5.76)
SODIUM BLD-SCNC: 138 MMOL/L (ref 132–146)
TROPONIN I SERPL-MCNC: 0.05 NG/ML
WBC NRBC COR # BLD: 6.72 10*3/MM3 (ref 3.5–10.8)

## 2017-06-24 PROCEDURE — 85025 COMPLETE CBC W/AUTO DIFF WBC: CPT | Performed by: INTERNAL MEDICINE

## 2017-06-24 PROCEDURE — 83735 ASSAY OF MAGNESIUM: CPT | Performed by: INTERNAL MEDICINE

## 2017-06-24 PROCEDURE — 25810000003 DEXTROSE-NACL PER 500 ML: Performed by: INTERNAL MEDICINE

## 2017-06-24 PROCEDURE — 82570 ASSAY OF URINE CREATININE: CPT | Performed by: INTERNAL MEDICINE

## 2017-06-24 PROCEDURE — 99233 SBSQ HOSP IP/OBS HIGH 50: CPT | Performed by: INTERNAL MEDICINE

## 2017-06-24 PROCEDURE — 93005 ELECTROCARDIOGRAM TRACING: CPT | Performed by: INTERNAL MEDICINE

## 2017-06-24 PROCEDURE — 84156 ASSAY OF PROTEIN URINE: CPT | Performed by: INTERNAL MEDICINE

## 2017-06-24 PROCEDURE — 93010 ELECTROCARDIOGRAM REPORT: CPT | Performed by: INTERNAL MEDICINE

## 2017-06-24 PROCEDURE — 25010000002 ENOXAPARIN PER 10 MG: Performed by: INTERNAL MEDICINE

## 2017-06-24 PROCEDURE — 84100 ASSAY OF PHOSPHORUS: CPT | Performed by: INTERNAL MEDICINE

## 2017-06-24 PROCEDURE — 80048 BASIC METABOLIC PNL TOTAL CA: CPT | Performed by: INTERNAL MEDICINE

## 2017-06-24 PROCEDURE — 87205 SMEAR GRAM STAIN: CPT | Performed by: INTERNAL MEDICINE

## 2017-06-24 PROCEDURE — 84484 ASSAY OF TROPONIN QUANT: CPT | Performed by: INTERNAL MEDICINE

## 2017-06-24 RX ORDER — CLONIDINE HYDROCHLORIDE 0.1 MG/1
0.1 TABLET ORAL ONCE
Status: COMPLETED | OUTPATIENT
Start: 2017-06-24 | End: 2017-06-24

## 2017-06-24 RX ORDER — AMLODIPINE BESYLATE 5 MG/1
5 TABLET ORAL DAILY
Status: DISCONTINUED | OUTPATIENT
Start: 2017-06-24 | End: 2017-06-25

## 2017-06-24 RX ADMIN — CLONIDINE HYDROCHLORIDE 0.3 MG: 0.2 TABLET ORAL at 20:06

## 2017-06-24 RX ADMIN — CLONIDINE HYDROCHLORIDE 0.1 MG: 0.1 TABLET ORAL at 17:41

## 2017-06-24 RX ADMIN — POTASSIUM CHLORIDE 40 MEQ: 750 CAPSULE, EXTENDED RELEASE ORAL at 00:06

## 2017-06-24 RX ADMIN — CLONIDINE HYDROCHLORIDE 0.2 MG: 0.2 TABLET ORAL at 08:40

## 2017-06-24 RX ADMIN — AMLODIPINE BESYLATE 5 MG: 5 TABLET ORAL at 16:24

## 2017-06-24 RX ADMIN — ENOXAPARIN SODIUM 40 MG: 40 INJECTION SUBCUTANEOUS at 08:42

## 2017-06-24 RX ADMIN — DEXTROSE AND SODIUM CHLORIDE 100 ML/HR: 5; 900 INJECTION, SOLUTION INTRAVENOUS at 10:31

## 2017-06-24 RX ADMIN — DEXTROSE AND SODIUM CHLORIDE 100 ML/HR: 5; 900 INJECTION, SOLUTION INTRAVENOUS at 00:10

## 2017-06-24 RX ADMIN — CARVEDILOL 25 MG: 12.5 TABLET, FILM COATED ORAL at 20:06

## 2017-06-24 RX ADMIN — CARVEDILOL 25 MG: 12.5 TABLET, FILM COATED ORAL at 08:40

## 2017-06-24 NOTE — PROGRESS NOTES
Intensive Care Follow-up      LOS: 1 day     Mr. Shay Serrano, 27 y.o. male is followed for: RONALD (acute kidney injury)     Subjective - Interval History     Awake, no distress  Feels better today  Denies chest pain or palpitations    The patient's relevant past medical, surgical and social history were reviewed and updated in Epic as appropriate.     Objective     Infusions:    dextrose 5 % and sodium chloride 0.9 % 100 mL/hr Last Rate: 100 mL/hr (06/24/17 0010)   niCARdipine 5-15 mg/hr Last Rate: Stopped (06/23/17 1505)     Medications:    carvedilol 25 mg Oral Q12H   CloNIDine 0.2 mg Oral Q12H     Intake/Output       06/23/17 0700 - 06/24/17 0659 06/24/17 0700 - 06/25/17 0659    Intake (ml) 4405.7 100    Output (ml) 1650 25    Net (ml) 2755.7 75    Last Weight 168 lb (76.2 kg) --        Vital Sign Min/Max for last 24 hours  Temp  Min: 98.1 °F (36.7 °C)  Max: 98.9 °F (37.2 °C)   BP  Min: 108/77  Max: 190/124   Pulse  Min: 48  Max: 120   Resp  Min: 14  Max: 20   SpO2  Min: 93 %  Max: 99 %   No Data Recorded        Physical Exam:   GENERAL: Awake, no distress   HEENT: No adenopathy or thyromegaly   LUNGS: Clear without wheezes or rhonchi   HEART: Regular rate and rhythm without murmurs   ABDOMEN: Soft, nontender.  Bowel sounds present   EXTREMITIES: Palpable pulses.  No significant edema and no cyanosis   NEURO/PSYCH: Awake and alert.  Follows commands.  No neurologic deficit      Results from last 7 days  Lab Units 06/24/17  0328 06/23/17  1433 06/23/17  0909   WBC 10*3/mm3 6.72 14.07* 15.91*   HEMOGLOBIN g/dL 11.6* 14.7 15.4   PLATELETS 10*3/mm3 198 219 221       Results from last 7 days  Lab Units 06/24/17  0328 06/23/17  1433 06/23/17  0909   SODIUM mmol/L 138 137 141   POTASSIUM mmol/L 3.8 3.0* 3.1*   TOTAL CO2 mmol/L 23.0 18.0* 20.0   BUN mg/dL 30* 21 25*   CREATININE mg/dL 4.00* 2.90* 2.80*   MAGNESIUM mg/dL 1.6  --   --    PHOSPHORUS mg/dL 3.2  --   --    GLUCOSE mg/dL 102* 159* 109*     Estimated Creatinine  Clearance: 27.2 mL/min (by C-G formula based on Cr of 4).      Results from last 7 days  Lab Units 06/23/17  1433   HEMOGLOBIN A1C % 5.40           No results found for: LACTATE       Images: Chest x-ray on admission clear    I reviewed the patient's results and images.     Impression      Hospital Problem List     * (Principal)RONALD (acute kidney injury)    Hypertensive emergency    Anxiety    Tobacco abuse    Diastolic dysfunction    Illicit drug use (UDS + THC)               Plan        Blood pressure control  Mobilize  Nephrology consultation  Monitor for evidence of drug withdrawal  Continue to monitor in the intensive care unit     Plan of care and goals reviewed with mulitdisciplinary team at daily rounds   I discussed the patient's findings and my recommendations with patient, nursing staff and consulting provider       LENI Pelayo MD  Pulmonary and Critical Care Medicine

## 2017-06-24 NOTE — CONSULTS
NAL Consult Note    Shay Serrano  1990  [id number]    Date of Admit:  6/23/2017    Date of Consult: 6/24/2017        Requesting Provider: Pattie Lira MD  Evaluating Physician: Migule Angel Gandara MD        Reason for Consultation:  RONALD  History of present illness:    Patient is a 27 y.o.  Yr old male WITH H/O HTN, ? CKD, LEUKEMIA AS A CHILD, THC USE ADMITTED WITH RONALD AND SEVERE HTN. RAN OUT OF BP MEDS. PREVIOUSLY ON CLONIDINE, COREG AND LISINOPRIL. RECENTLY PRESCRIBED HYDRALAZINE AND LABETALOL BY DR COHEN. CREAT 1.3 12/2016, 2.5 6/21/17, 2.9 6/23/17 AND 4.0 TODAY. DARK URINE. CPK NORMAL. RECENT RENAL U/S AT OSH REVEALED BILATERAL SMALL CYSTS AND NONOBSTRUCTING RENAL STONE ON THE LEFT. STATES HE HAD ARF IN THE PAST. NO NSAIA USE. NO RECENT CONTRAST.    Past Medical History:   Diagnosis Date   • Anxiety    • HTN (hypertension)    • Leukemia        Past Surgical History:   Procedure Laterality Date   • CARDIAC CATHETERIZATION N/A 12/22/2016    Procedure: Left Heart Cath;  Surgeon: Ning Freeman MD;  Location: [facility] CATH INVASIVE LOCATION;  Service:    • PORTACATH PLACEMENT         Social History     Social History   • Marital status: Single     Spouse name: N/A   • Number of children: N/A   • Years of education: N/A     Social History Main Topics   • Smoking status: Current Every Day Smoker     Packs/day: 0.50     Years: 12.00     Types: Cigarettes   • Smokeless tobacco: Never Used   • Alcohol use No   • Drug use: Yes     Special: Marijuana   • Sexual activity: Defer     Other Topics Concern   • None     Social History Narrative    Mr. Serrano is single and lives in [place], [place]. He works as a  for [email or web address].        family history includes Coronary artery disease in his father; Heart attack in his father. NO FH OF RENAL DZ.    No Known Allergies    Medication:    Current Facility-Administered Medications:   •  acetaminophen (TYLENOL) tablet 650 mg, 650 mg, Oral, Q4H PRN, Jose Pierce,  MD  •  carvedilol (COREG) tablet 25 mg, 25 mg, Oral, Q12H, Jose Pierce MD, 25 mg at 06/24/17 0840  •  CloNIDine (CATAPRES) tablet 0.2 mg, 0.2 mg, Oral, Q12H, Jose Pierce MD, 0.2 mg at 06/24/17 0840  •  dextrose 5 % and sodium chloride 0.9 % infusion, 100 mL/hr, Intravenous, Continuous, Jose Pierce MD, Last Rate: 100 mL/hr at 06/24/17 0010, 100 mL/hr at 06/24/17 0010  •  enoxaparin (LOVENOX) syringe 40 mg, 40 mg, Subcutaneous, Daily, Jose Pierce MD, 40 mg at 06/24/17 0842  •  niCARdipine (CARDENE) 25 mg/250 mL (0.1 mg/mL) 0.9% NS VTB infusion, 5-15 mg/hr, Intravenous, Titrated, Jose Pierce MD, Stopped at 06/23/17 1505  •  ondansetron (ZOFRAN) injection 4 mg, 4 mg, Intravenous, Q6H PRN, Jose Pierce MD, 4 mg at 06/23/17 1047  •  potassium chloride (KLOR-CON) packet 40 mEq, 40 mEq, Oral, PRN, Laura Mayo, APRN  •  potassium chloride (MICRO-K) CR capsule 40 mEq, 40 mEq, Oral, PRN, Laura Mayo APRN, 40 mEq at 06/24/17 0006  •  promethazine (PHENERGAN) injection 12.5 mg, 12.5 mg, Intravenous, Q4H PRN, Jose Pierce MD, 12.5 mg at 06/23/17 0829  •  sodium chloride 0.9 % flush 1-10 mL, 1-10 mL, Intravenous, PRN, Jose Pierce MD    Prescriptions Prior to Admission   Medication Sig Dispense Refill Last Dose   • amLODIPine (NORVASC) 5 MG tablet Take 1 tablet by mouth Daily. 90 tablet 3    • carvedilol (COREG) 25 MG tablet Take 1 tablet by mouth Every 12 (Twelve) Hours. 180 tablet 3    • CloNIDine (CATAPRES) 0.2 MG tablet Take 0.2 mg by mouth 2 (Two) Times a Day.   Past Week at Unknown time   • escitalopram (LEXAPRO) 10 MG tablet Take 10 mg by mouth Daily.   Past Week at Unknown time   • lisinopril (PRINIVIL,ZESTRIL) 20 MG tablet Take 1 tablet by mouth Every 12 (Twelve) Hours. 90 tablet 3    • nicotine (NICODERM CQ) 21 MG/24HR patch Place 1 patch on the skin Daily. 30 patch 0        Review of Systems:    Constitutional-- No  "Fever, chills or sweats. No significant change in weight  Eye-- no diplopia, no conjunctivitis  ENT-- No new hearing or throat complaints.  No epistaxis or oral sores. No odynophagia or dysphagia. + headache. NO photophobia or neck stiffness.  CV-- No chest pain, palpitations, soa, or edema  Resp-- No SOB/cough/Hemoptysis  GI- + nausea. NO vomiting or diarrhea.  No hematochezia, melena, or hematemesis.  -- No dysuria, hematuria, or flank pain. No lower tract obstructive symptoms  Skin-- No rash, warm and dry  Lymph- no painful or swollen lymph nodes in neck/axilla or groin.   Heme- No active bruising or bleeding; no Hx of DVT or PE.  MS-- no swelling or pain in the joints  Neuro-- No acute focal weakness or numbness in the arms or legs.  No seizures.  Psych--No anxiety or depression  Endo--No cold or heat intolerance.  No polyuria, polydipsia, or polyphagia    Full review of systems reviewed and negative otherwise for acute complaints    Physical Exam:   Vital Signs   Blood pressure 146/98, pulse 73, temperature 98.8 °F (37.1 °C), temperature source Axillary, resp. rate 14, height 69\" (175.3 cm), weight 168 lb (76.2 kg), SpO2 99 %.     GENERAL: Awake and alert, in no acute distress.   HEENT: Normocephalic, atraumatic.  PER.  No conjunctivitis. No icterus. Oropharynx clear without evidence of thrush or exudate. No evidence of peridontal disease.    NECK: Supple without nuchal rigidity. No mass.  LYMPH: No painful cervical, axillary or inguinal lymphadenopathy.  HEART: RRR; No murmur, rubs, gallops. No bruits in neck, abdomen, or groins, no edema  LUNGS: Normal respiratory effort. Nonlabored. No dullness.  No crepitus.  Clear to auscultation bilaterally without wheezing, rales, rhonchi.  ABDOMEN: Soft, nontender, nondistended. Positive bowel sounds. No rebound or guarding. NO mass or HSM.  JOINTS:  Full range of motion, no redness or tenderness.  EXT:  No cyanosis, clubbing or edema.  :  External genitalia without " swelling or lesion  SKIN: Warm and dry without rash  NEURO: Oriented to PPT. No focal neurological deficits. Strength equal bilateral  PSYCHIATRIC: Normal insight and judgement. Cooperative with PE    Laboratory Data    Results from last 7 days  Lab Units 06/24/17  0328 06/23/17  1433 06/23/17  0909   HEMOGLOBIN g/dL 11.6* 14.7 15.4   HEMATOCRIT % 35.7* 43.3 43.7       Results from last 7 days  Lab Units 06/24/17  0328 06/23/17  1433 06/23/17  0909   SODIUM mmol/L 138 137 141   POTASSIUM mmol/L 3.8 3.0* 3.1*   CHLORIDE mmol/L 106 106 108   TOTAL CO2 mmol/L 23.0 18.0* 20.0   BUN mg/dL 30* 21 25*   CREATININE mg/dL 4.00* 2.90* 2.80*   CALCIUM mg/dL 7.9* 9.3 8.7   PHOSPHORUS mg/dL 3.2  --   --    MAGNESIUM mg/dL 1.6  --   --    ALBUMIN g/dL  --   --  4.10       Results from last 7 days  Lab Units 06/24/17  0328   GLUCOSE mg/dL 102*       Results from last 7 days  Lab Units 06/23/17  0937   COLOR UA  Yellow   CLARITY UA  Clear   PH, URINE  6.0   SPECIFIC GRAVITY, URINE  1.008   GLUCOSE UA  Negative   KETONES UA  Negative   BILIRUBIN UA  Negative   PROTEIN UA  30 mg/dL (1+)*   BLOOD UA  Large (3+)*   LEUKOCYTES UA  Negative   NITRITE UA  Negative       Results from last 7 days  Lab Units 06/23/17  0909   ALK PHOS U/L 66   BILIRUBIN mg/dL 0.6   ALT (SGPT) U/L 19   AST (SGOT) U/L 23     Estimated Creatinine Clearance: 27.2 mL/min (by C-G formula based on Cr of 4).    Radiology:          Impression: RONALD. ? ETIOLOGY VS MALIGNANT HTN. SEVERE HTN. ANEMIA.      PLAN: Thank you for asking us to see Shay Serrano, I recommend the following: URINE FOR CREAT,PROTEIN, EOSIN,AND UIEP. SPEP,SCOT, ANCA AND ANTI-GBM. RENAL PANEL AM. WILL FOLLOW. MAY BE HEADED TOWARD RRT AND DIALYSIS ACCESS PLACEMENT.       Miguel Angel Gandara MD  6/24/2017  9:48 AM

## 2017-06-24 NOTE — PLAN OF CARE
Problem: Patient Care Overview (Adult)  Goal: Plan of Care Review  Outcome: Ongoing (interventions implemented as appropriate)    06/24/17 0536   Coping/Psychosocial Response Interventions   Plan Of Care Reviewed With patient   Patient Care Overview   Progress improving   Outcome Evaluation   Outcome Summary/Follow up Plan VSS off of cardene and back on po meds. No complaints of pain/headache or nausea. Patient tolerating po well.          Problem: Anxiety (Adult)  Goal: Reduction/Resolution  Outcome: Ongoing (interventions implemented as appropriate)    Problem: Hypertensive Disease/Crisis (Arterial) (Adult)  Goal: Signs and Symptoms of Listed Potential Problems Will be Absent or Manageable (Hypertensive Disease/Crisis)  Outcome: Ongoing (interventions implemented as appropriate)

## 2017-06-24 NOTE — PLAN OF CARE
Problem: Patient Care Overview (Adult)  Goal: Plan of Care Review  Outcome: Ongoing (interventions implemented as appropriate)    06/24/17 1746   Coping/Psychosocial Response Interventions   Plan Of Care Reviewed With patient   Patient Care Overview   Progress no change   Outcome Evaluation   Outcome Summary/Follow up Plan Pt stable for ost of shift, however, became increasingly hypertensive toward end of shift. Norvasc started with no effect, clonidine dose increased to .3mg q12. UOP adequate, although urine is very dark. 24hr Urine started at 1100. IVF D/C'd r/t HTN.        Goal: Discharge Needs Assessment  Outcome: Ongoing (interventions implemented as appropriate)    06/24/17 1746   Discharge Needs Assessment   Concerns To Be Addressed transportation   Concerns Comments Patient's car is in the parking lot at the ED in [facility]. He will need transportation home on discharge, as he does not have anyone else to pick him up.          Problem: Hypertensive Disease/Crisis (Arterial) (Adult)  Goal: Signs and Symptoms of Listed Potential Problems Will be Absent or Manageable (Hypertensive Disease/Crisis)  Outcome: Ongoing (interventions implemented as appropriate)    Problem: Renal Failure/Kidney Injury, Acute (Adult)  Goal: Signs and Symptoms of Listed Potential Problems Will be Absent or Manageable (Renal Failure/Kidney Injury, Acute)  Outcome: Ongoing (interventions implemented as appropriate)

## 2017-06-25 ENCOUNTER — APPOINTMENT (OUTPATIENT)
Dept: CARDIOLOGY | Facility: HOSPITAL | Age: 27
End: 2017-06-25
Attending: INTERNAL MEDICINE

## 2017-06-25 PROBLEM — I21.A1 NON-ST ELEVATION MYOCARDIAL INFARCTION (NSTEMI), TYPE 2: Status: ACTIVE | Noted: 2017-06-25

## 2017-06-25 LAB
ALBUMIN SERPL-MCNC: 3.7 G/DL (ref 3.2–4.8)
ALBUMIN/GLOB SERPL: 1.3 G/DL (ref 1.5–2.5)
ALP SERPL-CCNC: 52 U/L (ref 25–100)
ALT SERPL W P-5'-P-CCNC: 12 U/L (ref 7–40)
AMMONIA BLD-SCNC: 25 UMOL/L (ref 19–60)
ANION GAP SERPL CALCULATED.3IONS-SCNC: 9 MMOL/L (ref 3–11)
AST SERPL-CCNC: 16 U/L (ref 0–33)
BASOPHILS # BLD AUTO: 0.03 10*3/MM3 (ref 0–0.2)
BASOPHILS NFR BLD AUTO: 0.4 % (ref 0–1)
BH CV ECHO MEAS - AO ROOT AREA (BSA CORRECTED): 1.6
BH CV ECHO MEAS - AO ROOT AREA: 7.5 CM^2
BH CV ECHO MEAS - AO ROOT DIAM: 3.1 CM
BH CV ECHO MEAS - BSA(HAYCOCK): 1.9 M^2
BH CV ECHO MEAS - BSA: 1.9 M^2
BH CV ECHO MEAS - BZI_BMI: 24.8 KILOGRAMS/M^2
BH CV ECHO MEAS - BZI_METRIC_HEIGHT: 175.3 CM
BH CV ECHO MEAS - BZI_METRIC_WEIGHT: 76.2 KG
BH CV ECHO MEAS - CONTRAST EF (2CH): 61.2 ML/M^2
BH CV ECHO MEAS - CONTRAST EF 4CH: 57.5 ML/M^2
BH CV ECHO MEAS - EDV(CUBED): 112.7 ML
BH CV ECHO MEAS - EDV(MOD-SP2): 116 ML
BH CV ECHO MEAS - EDV(MOD-SP4): 113 ML
BH CV ECHO MEAS - EDV(TEICH): 109.1 ML
BH CV ECHO MEAS - EF(CUBED): 72 %
BH CV ECHO MEAS - EF(MOD-SP2): 61.2 %
BH CV ECHO MEAS - EF(MOD-SP4): 57.5 %
BH CV ECHO MEAS - EF(TEICH): 63.6 %
BH CV ECHO MEAS - ESV(CUBED): 31.6 ML
BH CV ECHO MEAS - ESV(MOD-SP2): 45 ML
BH CV ECHO MEAS - ESV(MOD-SP4): 48 ML
BH CV ECHO MEAS - ESV(TEICH): 39.7 ML
BH CV ECHO MEAS - FS: 34.6 %
BH CV ECHO MEAS - IVS/LVPW: 1.1
BH CV ECHO MEAS - IVSD: 1.8 CM
BH CV ECHO MEAS - LA DIMENSION: 4.1 CM
BH CV ECHO MEAS - LA/AO: 1.3
BH CV ECHO MEAS - LAT PEAK E' VEL: 8.2 CM/SEC
BH CV ECHO MEAS - LV DIASTOLIC VOL/BSA (35-75): 58.9 ML/M^2
BH CV ECHO MEAS - LV MASS(C)D: 382.6 GRAMS
BH CV ECHO MEAS - LV MASS(C)DI: 199.5 GRAMS/M^2
BH CV ECHO MEAS - LV MAX PG: 4 MMHG
BH CV ECHO MEAS - LV MEAN PG: 2 MMHG
BH CV ECHO MEAS - LV SYSTOLIC VOL/BSA (12-30): 25 ML/M^2
BH CV ECHO MEAS - LV V1 MAX: 100 CM/SEC
BH CV ECHO MEAS - LV V1 MEAN: 66.7 CM/SEC
BH CV ECHO MEAS - LV V1 VTI: 20.5 CM
BH CV ECHO MEAS - LVIDD: 4.8 CM
BH CV ECHO MEAS - LVIDS: 3.2 CM
BH CV ECHO MEAS - LVLD AP2: 7.7 CM
BH CV ECHO MEAS - LVLD AP4: 7.3 CM
BH CV ECHO MEAS - LVLS AP2: 6.6 CM
BH CV ECHO MEAS - LVLS AP4: 6.2 CM
BH CV ECHO MEAS - LVOT AREA (M): 4.2 CM^2
BH CV ECHO MEAS - LVOT AREA: 4.2 CM^2
BH CV ECHO MEAS - LVOT DIAM: 2.3 CM
BH CV ECHO MEAS - LVPWD: 1.7 CM
BH CV ECHO MEAS - MED PEAK E' VEL: 6.92 CM/SEC
BH CV ECHO MEAS - MV A MAX VEL: 68.1 CM/SEC
BH CV ECHO MEAS - MV E MAX VEL: 101 CM/SEC
BH CV ECHO MEAS - MV E/A: 1.5
BH CV ECHO MEAS - PA ACC SLOPE: 561 CM/SEC^2
BH CV ECHO MEAS - PA ACC TIME: 0.12 SEC
BH CV ECHO MEAS - PA PR(ACCEL): 26.8 MMHG
BH CV ECHO MEAS - PI END-D VEL: 79.3 CM/SEC
BH CV ECHO MEAS - RAP SYSTOLE: 3 MMHG
BH CV ECHO MEAS - RVDD: 2.4 CM
BH CV ECHO MEAS - RVSP: 24.7 MMHG
BH CV ECHO MEAS - SI(CUBED): 42.3 ML/M^2
BH CV ECHO MEAS - SI(LVOT): 44.4 ML/M^2
BH CV ECHO MEAS - SI(MOD-SP2): 37 ML/M^2
BH CV ECHO MEAS - SI(MOD-SP4): 33.9 ML/M^2
BH CV ECHO MEAS - SI(TEICH): 36.2 ML/M^2
BH CV ECHO MEAS - SV(CUBED): 81.1 ML
BH CV ECHO MEAS - SV(LVOT): 85.2 ML
BH CV ECHO MEAS - SV(MOD-SP2): 71 ML
BH CV ECHO MEAS - SV(MOD-SP4): 65 ML
BH CV ECHO MEAS - SV(TEICH): 69.4 ML
BH CV ECHO MEAS - TAPSE (>1.6): 1.8 CM2
BH CV ECHO MEAS - TR MAX VEL: 233 CM/SEC
BH CV VAS BP RIGHT ARM: NORMAL MMHG
BH CV XLRA - RV BASE: 3.6 CM
BH CV XLRA - RV LENGTH: 6.9 CM
BH CV XLRA - RV MID: 3.7 CM
BH CV XLRA - TDI S': 12.7 CM/SEC
BILIRUB SERPL-MCNC: 0.5 MG/DL (ref 0.3–1.2)
BUN BLD-MCNC: 31 MG/DL (ref 9–23)
BUN/CREAT SERPL: 8.4 (ref 7–25)
CA-I SERPL ISE-MCNC: 1.16 MMOL/L (ref 1.12–1.32)
CALCIUM SPEC-SCNC: 8.9 MG/DL (ref 8.7–10.4)
CHLORIDE SERPL-SCNC: 106 MMOL/L (ref 99–109)
CO2 SERPL-SCNC: 26 MMOL/L (ref 20–31)
CREAT BLD-MCNC: 3.7 MG/DL (ref 0.6–1.3)
DEPRECATED RDW RBC AUTO: 43.2 FL (ref 37–54)
E/E' RATIO: 13
EOSINOPHIL # BLD AUTO: 0.16 10*3/MM3 (ref 0.1–0.3)
EOSINOPHIL NFR BLD AUTO: 2 % (ref 0–3)
ERYTHROCYTE [DISTWIDTH] IN BLOOD BY AUTOMATED COUNT: 13.9 % (ref 11.3–14.5)
GFR SERPL CREATININE-BSD FRML MDRD: 20 ML/MIN/1.73
GLOBULIN UR ELPH-MCNC: 2.8 GM/DL
GLUCOSE BLD-MCNC: 85 MG/DL (ref 70–100)
HCT VFR BLD AUTO: 41.4 % (ref 38.9–50.9)
HGB BLD-MCNC: 13.8 G/DL (ref 13.1–17.5)
IMM GRANULOCYTES # BLD: 0.04 10*3/MM3 (ref 0–0.03)
IMM GRANULOCYTES NFR BLD: 0.5 % (ref 0–0.6)
INR PPP: 1.02
LEFT ATRIUM VOLUME INDEX: 19.8 ML/M2
LYMPHOCYTES # BLD AUTO: 1.46 10*3/MM3 (ref 0.6–4.8)
LYMPHOCYTES NFR BLD AUTO: 17.8 % (ref 24–44)
MAGNESIUM SERPL-MCNC: 1.7 MG/DL (ref 1.3–2.7)
MCH RBC QN AUTO: 28.3 PG (ref 27–31)
MCHC RBC AUTO-ENTMCNC: 33.3 G/DL (ref 32–36)
MCV RBC AUTO: 85 FL (ref 80–99)
MONOCYTES # BLD AUTO: 1.15 10*3/MM3 (ref 0–1)
MONOCYTES NFR BLD AUTO: 14.1 % (ref 0–12)
NEUTROPHILS # BLD AUTO: 5.34 10*3/MM3 (ref 1.5–8.3)
NEUTROPHILS NFR BLD AUTO: 65.2 % (ref 41–71)
PHOSPHATE SERPL-MCNC: 4.1 MG/DL (ref 2.4–5.1)
PLATELET # BLD AUTO: 234 10*3/MM3 (ref 150–450)
PMV BLD AUTO: 10 FL (ref 6–12)
POTASSIUM BLD-SCNC: 3.7 MMOL/L (ref 3.5–5.5)
PROT SERPL-MCNC: 6.5 G/DL (ref 5.7–8.2)
PROTHROMBIN TIME: 11.1 SECONDS (ref 9.6–11.5)
RBC # BLD AUTO: 4.87 10*6/MM3 (ref 4.2–5.76)
SODIUM BLD-SCNC: 141 MMOL/L (ref 132–146)
TROPONIN I SERPL-MCNC: 0.24 NG/ML
WBC NRBC COR # BLD: 8.18 10*3/MM3 (ref 3.5–10.8)

## 2017-06-25 PROCEDURE — 86038 ANTINUCLEAR ANTIBODIES: CPT | Performed by: INTERNAL MEDICINE

## 2017-06-25 PROCEDURE — 82140 ASSAY OF AMMONIA: CPT | Performed by: INTERNAL MEDICINE

## 2017-06-25 PROCEDURE — 86256 FLUORESCENT ANTIBODY TITER: CPT | Performed by: INTERNAL MEDICINE

## 2017-06-25 PROCEDURE — 83520 IMMUNOASSAY QUANT NOS NONAB: CPT | Performed by: INTERNAL MEDICINE

## 2017-06-25 PROCEDURE — 84166 PROTEIN E-PHORESIS/URINE/CSF: CPT | Performed by: INTERNAL MEDICINE

## 2017-06-25 PROCEDURE — 93306 TTE W/DOPPLER COMPLETE: CPT

## 2017-06-25 PROCEDURE — 84100 ASSAY OF PHOSPHORUS: CPT | Performed by: INTERNAL MEDICINE

## 2017-06-25 PROCEDURE — 84484 ASSAY OF TROPONIN QUANT: CPT | Performed by: INTERNAL MEDICINE

## 2017-06-25 PROCEDURE — 84165 PROTEIN E-PHORESIS SERUM: CPT | Performed by: INTERNAL MEDICINE

## 2017-06-25 PROCEDURE — 83516 IMMUNOASSAY NONANTIBODY: CPT | Performed by: INTERNAL MEDICINE

## 2017-06-25 PROCEDURE — 80053 COMPREHEN METABOLIC PANEL: CPT | Performed by: INTERNAL MEDICINE

## 2017-06-25 PROCEDURE — 99233 SBSQ HOSP IP/OBS HIGH 50: CPT | Performed by: INTERNAL MEDICINE

## 2017-06-25 PROCEDURE — 85025 COMPLETE CBC W/AUTO DIFF WBC: CPT | Performed by: INTERNAL MEDICINE

## 2017-06-25 PROCEDURE — 93306 TTE W/DOPPLER COMPLETE: CPT | Performed by: INTERNAL MEDICINE

## 2017-06-25 PROCEDURE — 82330 ASSAY OF CALCIUM: CPT | Performed by: INTERNAL MEDICINE

## 2017-06-25 PROCEDURE — 84156 ASSAY OF PROTEIN URINE: CPT | Performed by: INTERNAL MEDICINE

## 2017-06-25 PROCEDURE — 83735 ASSAY OF MAGNESIUM: CPT | Performed by: INTERNAL MEDICINE

## 2017-06-25 PROCEDURE — 84155 ASSAY OF PROTEIN SERUM: CPT | Performed by: INTERNAL MEDICINE

## 2017-06-25 PROCEDURE — 85610 PROTHROMBIN TIME: CPT | Performed by: INTERNAL MEDICINE

## 2017-06-25 RX ORDER — AMLODIPINE BESYLATE 5 MG/1
5 TABLET ORAL EVERY 12 HOURS
Status: DISCONTINUED | OUTPATIENT
Start: 2017-06-25 | End: 2017-06-26 | Stop reason: HOSPADM

## 2017-06-25 RX ORDER — NICOTINE 21 MG/24HR
1 PATCH, TRANSDERMAL 24 HOURS TRANSDERMAL EVERY 24 HOURS
Status: DISCONTINUED | OUTPATIENT
Start: 2017-06-25 | End: 2017-06-26 | Stop reason: HOSPADM

## 2017-06-25 RX ORDER — CLONIDINE HYDROCHLORIDE 0.2 MG/1
0.2 TABLET ORAL 3 TIMES DAILY
Status: DISCONTINUED | OUTPATIENT
Start: 2017-06-25 | End: 2017-06-26

## 2017-06-25 RX ADMIN — AMLODIPINE BESYLATE 5 MG: 5 TABLET ORAL at 08:01

## 2017-06-25 RX ADMIN — CLONIDINE HYDROCHLORIDE 0.3 MG: 0.2 TABLET ORAL at 08:00

## 2017-06-25 RX ADMIN — NICARDIPINE HYDROCHLORIDE 5 MG/HR: 25 INJECTION INTRAVENOUS at 00:09

## 2017-06-25 RX ADMIN — CARVEDILOL 25 MG: 12.5 TABLET, FILM COATED ORAL at 08:00

## 2017-06-25 RX ADMIN — CLONIDINE HYDROCHLORIDE 0.2 MG: 0.2 TABLET ORAL at 21:35

## 2017-06-25 RX ADMIN — CARVEDILOL 25 MG: 12.5 TABLET, FILM COATED ORAL at 21:35

## 2017-06-25 RX ADMIN — CLONIDINE HYDROCHLORIDE 0.2 MG: 0.2 TABLET ORAL at 16:31

## 2017-06-25 RX ADMIN — AMLODIPINE BESYLATE 5 MG: 5 TABLET ORAL at 21:35

## 2017-06-25 NOTE — PROGRESS NOTES
Intensive Care Follow-up      LOS: 2 days     Mr. Shay Serrano, 27 y.o. male is followed for: RONALD (acute kidney injury)     Subjective - Interval History     No problems overnight  Currently off Cardene with good blood pressure control following morning antihypertensive medications  No chest pain or arrhythmias    The patient's relevant past medical, surgical and social history were reviewed and updated in Epic as appropriate.     Objective     Infusions:    niCARdipine 5-15 mg/hr Last Rate: Stopped (06/25/17 0200)     Medications:    amLODIPine 5 mg Oral Q12H   carvedilol 25 mg Oral Q12H   CloNIDine 0.3 mg Oral Q12H     Intake/Output       06/24/17 0700 - 06/25/17 0659 06/25/17 0700 - 06/26/17 0659    Intake (ml) 1722 400    Output (ml) 5310 520    Net (ml) -3588 -120        Vital Sign Min/Max for last 24 hours  Temp  Min: 97.8 °F (36.6 °C)  Max: 98.6 °F (37 °C)   BP  Min: 133/87  Max: 190/138   Pulse  Min: 49  Max: 80   Resp  Min: 12  Max: 14   SpO2  Min: 92 %  Max: 100 %   No Data Recorded        Physical Exam:   GENERAL: Awake, no distress   HEENT: No adenopathy or thyromegaly   LUNGS: Clear without wheezes or rhonchi   HEART: Regular rate and rhythm without murmurs   ABDOMEN: Soft.  Nontender.  Bowel sounds present   EXTREMITIES: Palpable pulses.  No cyanosis or edema   NEURO/PSYCH: Awake and alert.  Follows commands.  No deficits      Results from last 7 days  Lab Units 06/25/17  0509 06/24/17  0328 06/23/17  1433   WBC 10*3/mm3 8.18 6.72 14.07*   HEMOGLOBIN g/dL 13.8 11.6* 14.7   PLATELETS 10*3/mm3 234 198 219       Results from last 7 days  Lab Units 06/25/17  0509 06/24/17  0328 06/23/17  1433   SODIUM mmol/L 141 138 137   POTASSIUM mmol/L 3.7 3.8 3.0*   TOTAL CO2 mmol/L 26.0 23.0 18.0*   BUN mg/dL 31* 30* 21   CREATININE mg/dL 3.70* 4.00* 2.90*   MAGNESIUM mg/dL 1.7 1.6  --    PHOSPHORUS mg/dL 4.1 3.2  --    GLUCOSE mg/dL 85 102* 159*     Estimated Creatinine Clearance: 29.5 mL/min (by C-G formula based  on Cr of 3.7).      Results from last 7 days  Lab Units 06/23/17  1433   HEMOGLOBIN A1C % 5.40           No results found for: LACTATE       Images: Admission chest x-ray clear    I reviewed the patient's results and images.     Impression      Hospital Problem List     * (Principal)RONALD (acute kidney injury)    Hypertensive emergency    Anxiety    Tobacco abuse    Diastolic dysfunction    Illicit drug use (UDS + THC)    Non-ST elevation myocardial infarction (NSTEMI), type 2               Plan        Get a transthoracic echocardiogram and will probably need a cardiac stress test as an outpatient given mild elevation in troponin  Renal function improving.  [facility]  Transfer to the floor  Home soon if renal function and blood pressure remained stable     Plan of care and goals reviewed with mulitdisciplinary team at daily rounds   I discussed the patient's findings and my recommendations with patient, family and nursing staff       LENI Pelayo MD  Pulmonary and Critical Care Medicine

## 2017-06-25 NOTE — PLAN OF CARE
Problem: Patient Care Overview (Adult)  Goal: Plan of Care Review  Outcome: Ongoing (interventions implemented as appropriate)    06/25/17 1712   Coping/Psychosocial Response Interventions   Plan Of Care Reviewed With patient   Patient Care Overview   Progress no change   Outcome Evaluation   Outcome Summary/Follow up Plan Pt hypertension similar to prior day, with SBP increasing to >160's and DBP>120's around 1600. Clonidine timing changed from .3mg q12 to .2mg q8 for better distribution of coverage. Transfer orders to tele DC'd to assess BP coverage until the morning. UOP 1070.         Problem: Hypertensive Disease/Crisis (Arterial) (Adult)  Goal: Signs and Symptoms of Listed Potential Problems Will be Absent or Manageable (Hypertensive Disease/Crisis)  Outcome: Ongoing (interventions implemented as appropriate)    Problem: Renal Failure/Kidney Injury, Acute (Adult)  Goal: Signs and Symptoms of Listed Potential Problems Will be Absent or Manageable (Renal Failure/Kidney Injury, Acute)  Outcome: Ongoing (interventions implemented as appropriate)

## 2017-06-25 NOTE — PLAN OF CARE
Problem: Renal Failure/Kidney Injury, Acute (Adult)  Goal: Signs and Symptoms of Listed Potential Problems Will be Absent or Manageable (Renal Failure/Kidney Injury, Acute)  Outcome: Ongoing (interventions implemented as appropriate)    06/25/17 0212   Renal Failure/Kidney Injury, Acute   Problems Assessed (Acute Renal Failure/Kidney Injury) all   Problems Present (Acute Renal Failure/Kidney Injury) fluid imbalance;hypertension

## 2017-06-25 NOTE — PROGRESS NOTES
Continued Stay Note  [facility]     Patient Name: Shay Serrano  MRN: [id number]  Today's Date: 6/25/2017    Admit Date: 6/23/2017          Discharge Plan       06/25/17 1049    Case Management/Social Work Plan    Plan d/c need    Patient/Family In Agreement With Plan other (see comments)    Additional Comments Pt transferred to our ICU from [facility] ED. Pt left car at [facility] and is unable to get home and has noone to get him to bring him back to his vehicle at d/c. Pt is not medically ready at this time, but SW will need to see re: transportation at d/c when it does happen. No other needs.              Discharge Codes     None        Expected Discharge Date and Time     Expected Discharge Date Expected Discharge Time    Jun 27, 2017             Alicia Dejesus RN

## 2017-06-25 NOTE — PROGRESS NOTES
"   LOS: 2 days    Patient Care Team:  THO Henderson as PCP - General (Family Medicine)    Reason For Visit:  F/U RONALD  Subjective           Review of Systems:    Pulm: No soa   CV:  No CP      Objective       amLODIPine 5 mg Oral Daily   carvedilol 25 mg Oral Q12H   CloNIDine 0.3 mg Oral Q12H       niCARdipine 5-15 mg/hr Last Rate: Stopped (06/25/17 0200)         Vital Signs:  Blood pressure (!) 190/138, pulse 78, temperature 97.8 °F (36.6 °C), temperature source Axillary, resp. rate 12, height 69\" (175.3 cm), weight 168 lb (76.2 kg), SpO2 100 %.    Flowsheet Rows         First Filed Value    Admission Height  69\" (175.3 cm) Documented at 06/23/2017 0800    Admission Weight  168 lb (76.2 kg) Documented at 06/23/2017 0800          06/24 0701 - 06/25 0700  In: 1722 [P.O.:840; I.V.:882]  Out: 5310 [Urine:5310]    Physical Exam:    General Appearance: NAD, alert and cooperative, Ox3  Eyes: PER, conjunctivae and sclerae normal, no icterus  Lungs: respirations regular and unlabored, no crepitus, clear to auscultation  Heart/CV: regular rhythm & normal rate, no murmur, no gallop, no rub and no edema  Abdomen: not distended, soft, non-tender, no masses,  bowel sounds present  Skin: No rash, Warm and dry    Radiology:             Labs: P/C RATIO 695. URINE EOS 2%. SEROLOGY PENDING.FAUSTINO GUZMAN    Results from last 7 days  Lab Units 06/25/17  0509 06/24/17  0328 06/23/17  1433   WBC 10*3/mm3 8.18 6.72 14.07*   HEMOGLOBIN g/dL 13.8 11.6* 14.7   HEMATOCRIT % 41.4 35.7* 43.3   PLATELETS 10*3/mm3 234 198 219       Results from last 7 days  Lab Units 06/25/17  0509 06/24/17  0328 06/23/17  1433 06/23/17  0909   SODIUM mmol/L 141 138 137 141   POTASSIUM mmol/L 3.7 3.8 3.0* 3.1*   CHLORIDE mmol/L 106 106 106 108   TOTAL CO2 mmol/L 26.0 23.0 18.0* 20.0   BUN mg/dL 31* 30* 21 25*   CREATININE mg/dL 3.70* 4.00* 2.90* 2.80*   CALCIUM mg/dL 8.9 7.9* 9.3 8.7   PHOSPHORUS mg/dL 4.1 3.2  --   --    MAGNESIUM mg/dL 1.7 1.6  --   --  "   ALBUMIN g/dL 3.70  --   --  4.10       Results from last 7 days  Lab Units 06/25/17  0509   GLUCOSE mg/dL 85         Results from last 7 days  Lab Units 06/25/17  0509   ALK PHOS U/L 52   BILIRUBIN mg/dL 0.5   ALT (SGPT) U/L 12   AST (SGOT) U/L 16             Results from last 7 days  Lab Units 06/23/17  0937   COLOR UA  Yellow   CLARITY UA  Clear   PH, URINE  6.0   SPECIFIC GRAVITY, URINE  1.008   GLUCOSE UA  Negative   KETONES UA  Negative   BILIRUBIN UA  Negative   PROTEIN UA  30 mg/dL (1+)*   BLOOD UA  Large (3+)*   LEUKOCYTES UA  Negative   NITRITE UA  Negative       Estimated Creatinine Clearance: 29.5 mL/min (by C-G formula based on Cr of 3.7).      Assessment     Principal Problem:    RONALD (acute kidney injury)  Active Problems:    Anxiety    Tobacco abuse    Diastolic dysfunction    Illicit drug use (UDS + THC)    Hypertensive emergency            Impression: NONOLIGURIC RONALD. GFR STARTING TO IMPROVE. ? AIN WITH + URINE EOSINOPHILS. SEVERE HTN.            Recommendations: INCREASE AMLODIPINE.  LAB AM.      Miguel Angel Gandara MD  06/25/17  8:13 AM

## 2017-06-26 VITALS
OXYGEN SATURATION: 98 % | SYSTOLIC BLOOD PRESSURE: 136 MMHG | BODY MASS INDEX: 24.56 KG/M2 | HEART RATE: 77 BPM | DIASTOLIC BLOOD PRESSURE: 92 MMHG | WEIGHT: 165.79 LBS | RESPIRATION RATE: 16 BRPM | HEIGHT: 69 IN | TEMPERATURE: 97.9 F

## 2017-06-26 LAB
ALBUMIN SERPL-MCNC: 2.8 G/DL (ref 2.9–4.4)
ALBUMIN SERPL-MCNC: 3.7 G/DL (ref 3.2–4.8)
ALBUMIN/GLOB SERPL: 0.9 {RATIO} (ref 0.7–1.7)
ALPHA1 GLOB FLD ELPH-MCNC: 0.3 G/DL (ref 0–0.4)
ALPHA2 GLOB SERPL ELPH-MCNC: 0.9 G/DL (ref 0.4–1)
ANION GAP SERPL CALCULATED.3IONS-SCNC: 11 MMOL/L (ref 3–11)
B-GLOBULIN SERPL ELPH-MCNC: 0.9 G/DL (ref 0.7–1.3)
BASOPHILS # BLD AUTO: 0.04 10*3/MM3 (ref 0–0.2)
BASOPHILS NFR BLD AUTO: 0.5 % (ref 0–1)
BUN BLD-MCNC: 38 MG/DL (ref 9–23)
BUN/CREAT SERPL: 10.6 (ref 7–25)
CALCIUM SPEC-SCNC: 9.2 MG/DL (ref 8.7–10.4)
CHLORIDE SERPL-SCNC: 103 MMOL/L (ref 99–109)
CO2 SERPL-SCNC: 24 MMOL/L (ref 20–31)
CREAT BLD-MCNC: 3.6 MG/DL (ref 0.6–1.3)
DEPRECATED RDW RBC AUTO: 40.1 FL (ref 37–54)
EOSINOPHIL # BLD AUTO: 0.23 10*3/MM3 (ref 0.1–0.3)
EOSINOPHIL NFR BLD AUTO: 2.7 % (ref 0–3)
ERYTHROCYTE [DISTWIDTH] IN BLOOD BY AUTOMATED COUNT: 13.3 % (ref 11.3–14.5)
GAMMA GLOB SERPL ELPH-MCNC: 1 G/DL (ref 0.4–1.8)
GFR SERPL CREATININE-BSD FRML MDRD: 20 ML/MIN/1.73
GLOBULIN SER CALC-MCNC: 3.1 G/DL (ref 2.2–3.9)
GLUCOSE BLD-MCNC: 83 MG/DL (ref 70–100)
HCT VFR BLD AUTO: 39.5 % (ref 38.9–50.9)
HGB BLD-MCNC: 13.3 G/DL (ref 13.1–17.5)
IMM GRANULOCYTES # BLD: 0.05 10*3/MM3 (ref 0–0.03)
IMM GRANULOCYTES NFR BLD: 0.6 % (ref 0–0.6)
LYMPHOCYTES # BLD AUTO: 1.97 10*3/MM3 (ref 0.6–4.8)
LYMPHOCYTES NFR BLD AUTO: 23 % (ref 24–44)
Lab: ABNORMAL
M-SPIKE: ABNORMAL G/DL
MAGNESIUM SERPL-MCNC: 1.8 MG/DL (ref 1.3–2.7)
MCH RBC QN AUTO: 27.7 PG (ref 27–31)
MCHC RBC AUTO-ENTMCNC: 33.7 G/DL (ref 32–36)
MCV RBC AUTO: 82.1 FL (ref 80–99)
MONOCYTES # BLD AUTO: 1.1 10*3/MM3 (ref 0–1)
MONOCYTES NFR BLD AUTO: 12.8 % (ref 0–12)
NEUTROPHILS # BLD AUTO: 5.18 10*3/MM3 (ref 1.5–8.3)
NEUTROPHILS NFR BLD AUTO: 60.4 % (ref 41–71)
PHOSPHATE SERPL-MCNC: 5.4 MG/DL (ref 2.4–5.1)
PLATELET # BLD AUTO: 250 10*3/MM3 (ref 150–450)
PMV BLD AUTO: 10.1 FL (ref 6–12)
POTASSIUM BLD-SCNC: 3.6 MMOL/L (ref 3.5–5.5)
PROT PATTERN SERPL ELPH-IMP: ABNORMAL
PROT SERPL-MCNC: 5.9 G/DL (ref 6–8.5)
RBC # BLD AUTO: 4.81 10*6/MM3 (ref 4.2–5.76)
SODIUM BLD-SCNC: 138 MMOL/L (ref 132–146)
WBC NRBC COR # BLD: 8.57 10*3/MM3 (ref 3.5–10.8)

## 2017-06-26 PROCEDURE — 99239 HOSP IP/OBS DSCHRG MGMT >30: CPT | Performed by: NURSE PRACTITIONER

## 2017-06-26 PROCEDURE — 80069 RENAL FUNCTION PANEL: CPT | Performed by: INTERNAL MEDICINE

## 2017-06-26 PROCEDURE — 83735 ASSAY OF MAGNESIUM: CPT

## 2017-06-26 PROCEDURE — 85025 COMPLETE CBC W/AUTO DIFF WBC: CPT | Performed by: INTERNAL MEDICINE

## 2017-06-26 RX ORDER — CLONIDINE HYDROCHLORIDE 0.2 MG/1
0.2 TABLET ORAL EVERY 8 HOURS SCHEDULED
Status: DISCONTINUED | OUTPATIENT
Start: 2017-06-26 | End: 2017-06-26 | Stop reason: HOSPADM

## 2017-06-26 RX ORDER — CLONIDINE HYDROCHLORIDE 0.2 MG/1
0.2 TABLET ORAL EVERY 8 HOURS SCHEDULED
Qty: 90 TABLET | Refills: 5 | Status: SHIPPED | OUTPATIENT
Start: 2017-06-26

## 2017-06-26 RX ORDER — AMLODIPINE BESYLATE 5 MG/1
5 TABLET ORAL EVERY 12 HOURS
Qty: 60 TABLET | Refills: 5 | Status: SHIPPED | OUTPATIENT
Start: 2017-06-26

## 2017-06-26 RX ADMIN — AMLODIPINE BESYLATE 5 MG: 5 TABLET ORAL at 08:24

## 2017-06-26 RX ADMIN — CARVEDILOL 25 MG: 12.5 TABLET, FILM COATED ORAL at 08:23

## 2017-06-26 RX ADMIN — CLONIDINE HYDROCHLORIDE 0.2 MG: 0.2 TABLET ORAL at 08:24

## 2017-06-26 RX ADMIN — POTASSIUM CHLORIDE 40 MEQ: 750 CAPSULE, EXTENDED RELEASE ORAL at 06:06

## 2017-06-26 NOTE — DISCHARGE SUMMARY
Discharge Summary    Patient name: Shay Serrano  CSN: [id number]  MRN: [id number]  : 1990  Today's date: 2017     Date of Admission: 2017  Date of Discharge:  2017    Admitting Physician:  Dr. iPerce  Primary Care Provider: THO Brooks  Consultations:   Nephrology:  Dr. Gandara    Admission Diagnosis:  Hypertensive Emergency     Discharge Diagnoses:   Hospital Problem List     * (Principal)RONALD (acute kidney injury)    Anxiety    Tobacco abuse    Diastolic dysfunction    Illicit drug use (UDS + THC)    Hypertensive emergency    Non-ST elevation myocardial infarction (NSTEMI), type 2        Allergies:  Review of patient's allergies indicates no known allergies.    Code Status:  Full Code    Procedures:  17 ECHO Left ventricular wall thickness is consistent with mild-to-moderate concentric hypertrophy. Normal left regular systolic function, ejection fraction 60%. Mild mitral ejection, trace tricuspid regurgitant. Trace aortic insufficiency.    History of Present Illness:  Patient is a 27 y.o. male with a long-standing history of high blood pressure. 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 He was admitted here previously for elevated cardiac enzymes but had near normal coronary arteries but was noted to have LVH. It sounds as if in the past he has run out of blood pressure medications and has become symptomatic.     He presented to the emergency department in [place] on the 21st simply for medication refill. He just moved there from [place] and had run out of his blood pressure medications. He was found to have an elevation of his creatinine to 2.5. He had an appointment with Dr. Herron the following day. He was given hydralazine and labetalol. Historically he has been treated with clonidine, Coreg, and lisinopril. He could not get the labetalol filled. He presented again to the emergency department early this morning with 2 days of nausea and  "headache. He was found to have continued elevation of his creatinine to 3.4. He had a CT of the abdomen which showed some colon wall thickening suggestive of colitis. However, the patient denies much in the way of lower bowel symptoms. He says he has had an occasional loose stool over the last several days but had no concerns in that regard. He says his symptoms of nausea and headache are typical of his blood pressure elevation.     He has noted no systemic symptoms such as fevers, chills, or malaise. He denies any chest pain. He was found to have red blood cells in his urine as well. He denies any urinary symptoms. His recent drug screen has been positive for THC but no other illicit substances    Hospital Course:  He was admitted into the ICU per the Intensivist service.  Nephrology was consulted. He required a cardene drip for a short period of time.  He was weaned off and placed back on home blood pressure medications.  His lisinopril was not restarted and some of his medications were adjusted. He had an ECHO with results stated above.  Urine creatinine was 77.6, total protein urine 54 and + urine eosinophils.  It was felt he may have Acute Interstitial Nephritis. He had mildly elevated troponins and his PCP may need to consider a cardiac stress test outpatient. His blood pressure is controlled.  His renal function has improved.  It is felt at this time that he is stable enough to go home.      Vitals:  BP (!) 169/117  Pulse 78  Temp 97.9 °F (36.6 °C) (Axillary)   Resp 16  Ht 69\" (175.3 cm)  Wt 165 lb 12.6 oz (75.2 kg)  SpO2 98%  BMI 24.48 kg/m2    Physical Exam:  Constitutional:  Appears well-developed and well-nourished. No distress.   HEENT:  Normocephalic and atraumatic. PERRL  Neck:  Neck supple. No JVD present.   CV: Normal rate, regular rhythm,  intact distal pulses.  No gallop, murmur or rub.  Pulmonary/Chest: Effort normal and breath sounds normal. No respiratory distress. No wheezes, rhonchi or " rales.   Abdominal: Soft. +BS. No distension and no mass. There is no tenderness.   Musculoskeletal: Normal muscle tone and strength  Neurological: Alert and oriented to person, place, and time.  No focal deficits  Skin: Skin is warm and dry. No rash noted. Multiple tattoos  Extremities:  No clubbing, edema or cyanosis  Psychiatric: Normal mood and affect. Behavior is normal.     Labs:    Results from last 7 days  Lab Units 06/26/17  0346   WBC 10*3/mm3 8.57   HEMOGLOBIN g/dL 13.3   HEMATOCRIT % 39.5   PLATELETS 10*3/mm3 250       Results from last 7 days  Lab Units 06/26/17  0346 06/25/17  0509   SODIUM mmol/L 138 141   POTASSIUM mmol/L 3.6 3.7   CHLORIDE mmol/L 103 106   TOTAL CO2 mmol/L 24.0 26.0   BUN mg/dL 38* 31*   CREATININE mg/dL 3.60* 3.70*   CALCIUM mg/dL 9.2 8.9   BILIRUBIN mg/dL  --  0.5   ALK PHOS U/L  --  52   ALT (SGPT) U/L  --  12   AST (SGOT) U/L  --  16   GLUCOSE mg/dL 83 85         Magnesium   Date Value Ref Range Status   06/26/2017 1.8 1.3 - 2.7 mg/dL Final   06/25/2017 1.7 1.3 - 2.7 mg/dL Final   06/24/2017 1.6 1.3 - 2.7 mg/dL Final     Phosphorus   Date Value Ref Range Status   06/26/2017 5.4 (H) 2.4 - 5.1 mg/dL Final   06/25/2017 4.1 2.4 - 5.1 mg/dL Final   06/24/2017 3.2 2.4 - 5.1 mg/dL Final               Discharge Medications:   Shay Serrano   Home Medication Instructions EMILE:[id number]    Printed on:06/26/17 3939   Medication Information                      amLODIPine (NORVASC) 5 MG tablet  Take 1 tablet by mouth Every 12 (Twelve) Hours.             carvedilol (COREG) 25 MG tablet  Take 1 tablet by mouth Every 12 (Twelve) Hours.             CloNIDine (CATAPRES) 0.2 MG tablet  Take 1 tablet by mouth Every 8 (Eight) Hours.             escitalopram (LEXAPRO) 10 MG tablet  Take 10 mg by mouth Daily.             nicotine (NICODERM CQ) 21 MG/24HR patch  Place 1 patch on the skin Daily.               Discharge Diet:   Diet Instructions     Diet: Regular; Thin Liquids, No Restrictions        Discharge Diet:  Regular   Fluid Consistency:  Thin Liquids, No Restrictions               Activity at Discharge:    Activity Instructions     Activity as Tolerated                   Follow-up Appointments  No future appointments.  Additional Instructions for the Follow-ups that You Need to Schedule     Discharge Follow-Up With Specified Provider    As directed    To:  [facility] [place]   Follow Up Details:  7/11/17, unknown time       Discharge Follow-up with PCP    As directed    Follow Up Details:  one week               Discharge Instructions:  Ok to go home today  Follow up as above  Scripts sent electronically  Discussed medication changes  Advised him to take BP at home 2-3 times a day and keep a log for his follow up visit.     THO Li, AGACNP-BC, FNP-BC  Pulmonary & Critical Care Medicine    Time: Discharge 45 min    CC: THO Brooks

## 2017-06-26 NOTE — PROGRESS NOTES
"   LOS: 3 days    Patient Care Team:  THO Henderson as PCP - General (Family Medicine)    Reason For Visit:  F/U RONALD.  Subjective           Review of Systems:    Pulm: No soa   CV:  No CP      Objective       amLODIPine 5 mg Oral Q12H   carvedilol 25 mg Oral Q12H   CloNIDine 0.2 mg Oral Q8H   nicotine 1 patch Transdermal Q24H            Vital Signs:  Blood pressure (!) 169/117, pulse 78, temperature 97.9 °F (36.6 °C), temperature source Axillary, resp. rate 16, height 69\" (175.3 cm), weight 165 lb 12.6 oz (75.2 kg), SpO2 98 %. BP /92.    Flowsheet Rows         First Filed Value    Admission Height  69\" (175.3 cm) Documented at 06/23/2017 0800    Admission Weight  168 lb (76.2 kg) Documented at 06/23/2017 0800          06/25 0701 - 06/26 0700  In: 1540 [P.O.:1540]  Out: 2915 [Urine:2915]    Physical Exam:    General Appearance: NAD, alert and cooperative, Ox3  Eyes: PER, conjunctivae and sclerae normal, no icterus  Lungs: respirations regular and unlabored, no crepitus, clear to auscultation  Heart/CV: regular rhythm & normal rate, no murmur, no gallop, no rub and no edema  Abdomen: not distended, soft, non-tender, no masses,  bowel sounds present  Skin: No rash, Warm and dry    Radiology: RENAL U/S AT OSH.            Labs: SEROLOGY PENDING.    Results from last 7 days  Lab Units 06/26/17  0346 06/25/17  0509 06/24/17  0328   WBC 10*3/mm3 8.57 8.18 6.72   HEMOGLOBIN g/dL 13.3 13.8 11.6*   HEMATOCRIT % 39.5 41.4 35.7*   PLATELETS 10*3/mm3 250 234 198       Results from last 7 days  Lab Units 06/26/17  0346 06/25/17  0509 06/24/17  0328 06/23/17  1433 06/23/17  0909   SODIUM mmol/L 138 141 138 137 141   POTASSIUM mmol/L 3.6 3.7 3.8 3.0* 3.1*   CHLORIDE mmol/L 103 106 106 106 108   TOTAL CO2 mmol/L 24.0 26.0 23.0 18.0* 20.0   BUN mg/dL 38* 31* 30* 21 25*   CREATININE mg/dL 3.60* 3.70* 4.00* 2.90* 2.80*   CALCIUM mg/dL 9.2 8.9 7.9* 9.3 8.7   PHOSPHORUS mg/dL 5.4* 4.1 3.2  --   --    MAGNESIUM mg/dL 1.8 " 1.7 1.6  --   --    ALBUMIN g/dL 3.70 3.70  --   --  4.10       Results from last 7 days  Lab Units 06/26/17  0346   GLUCOSE mg/dL 83         Results from last 7 days  Lab Units 06/25/17  0509   ALK PHOS U/L 52   BILIRUBIN mg/dL 0.5   ALT (SGPT) U/L 12   AST (SGOT) U/L 16             Results from last 7 days  Lab Units 06/23/17  0937   COLOR UA  Yellow   CLARITY UA  Clear   PH, URINE  6.0   SPECIFIC GRAVITY, URINE  1.008   GLUCOSE UA  Negative   KETONES UA  Negative   BILIRUBIN UA  Negative   PROTEIN UA  30 mg/dL (1+)*   BLOOD UA  Large (3+)*   LEUKOCYTES UA  Negative   NITRITE UA  Negative       Estimated Creatinine Clearance: 30.3 mL/min (by C-G formula based on Cr of 3.6).      Assessment     Principal Problem:    RONALD (acute kidney injury)  Active Problems:    Anxiety    Tobacco abuse    Diastolic dysfunction    Illicit drug use (UDS + THC)    Hypertensive emergency    Non-ST elevation myocardial infarction (NSTEMI), type 2            Impression: NONOLIGURIC RONALD WITH SLOWLY IMPROVING GFR. ? FROM MALIGNANT HTN. ? AIN FROM HYDRALAZINE. MILD PROTEINURIA. ? CKD. BP BETTER. PATIENT WANTS TO GO             Recommendations: HOME SOON OK WITH RENAL. F/U [facility] 7/11/17.      Miguel Angel Ganadra MD  06/26/17  11:17 AM

## 2017-06-26 NOTE — PROGRESS NOTES
Adult Nutrition  Assessment/PES    Patient Name:  Shay Serrano  YOB: 1990  MRN: [id number]  Admit Date:  6/23/2017    Assessment Date:  6/26/2017        Reason for Assessment       06/26/17 1035    Reason for Assessment    Reason For Assessment/Visit multidisciplinary rounds    Time Spent (min) 15                        Nutrition Prescription Ordered       06/26/17 1035    Nutrition Prescription PO    Current PO Diet Regular    Common Modifiers Cardiac                Problem/Interventions:                    Nutrition Intervention       06/26/17 1035    Nutrition Intervention    RD/Tech Action Follow Tx progress;Care plan reviewd              Education/Evaluation       06/26/17 1035    Monitor/Evaluation    Monitor Per protocol          Electronically signed by:  Galina Daniels RD  06/26/17 10:35 AM

## 2017-06-27 LAB
ALBUMIN 24H MFR UR ELPH: 49.1 %
ALPHA1 GLOB 24H MFR UR ELPH: 5.5 %
ALPHA2 GLOB 24H MFR UR ELPH: 9.1 %
ANA SER QL: NEGATIVE
B-GLOBULIN MFR UR ELPH: 24.6 %
C-ANCA TITR SER IF: NORMAL TITER
GAMMA GLOB 24H MFR UR ELPH: 11.7 %
GBM IGG SER-ACNC: 3 UNITS (ref 0–20)
Lab: ABNORMAL
M PROTEIN MFR UR ELPH: ABNORMAL %
MYELOPEROXIDASE AB SER-ACNC: <9 U/ML (ref 0–9)
P-ANCA ATYPICAL TITR SER IF: NORMAL TITER
P-ANCA TITR SER IF: NORMAL TITER
PROT 24H UR-MRATE: 1236 MG/24 HR (ref 30–150)
PROT UR-MCNC: 25.1 MG/DL
PROTEINASE3 AB SER IA-ACNC: <3.5 U/ML (ref 0–3.5)

## 2017-06-27 NOTE — PAYOR COMM NOTE
"Antonieta Serrano (27 y.o. Male) auth# [id number]   D/C     Date of Birth Social Security Number Address Home Phone MRN    1990  [address]  [facility] [id number] [phone] [id number]    Religious Marital Status          None Single       Admission Date Admission Type Admitting Provider Attending Provider Department, Room/Bed    17 Elective Pattie Lira MD  [facility] 2A ICU, N211/1    Discharge Date Discharge Disposition Discharge Destination        2017 Home or Self Care             Attending Provider: (none)    Allergies:  No Known Allergies    Isolation:  None   Infection:  None   Code Status:  Prior    Ht:  69\" (175.3 cm)   Wt:  165 lb 12.6 oz (75.2 kg)    Admission Cmt:  None   Principal Problem:  RONALD (acute kidney injury) [N17.9]                 Active Insurance as of 2017     Primary Coverage     Payor Plan Insurance Group Employer/Plan Group    [employer] MEDICA[employer] [id number]     Payor Plan Address Payor Plan Phone Number Effective From Effective To    PO  [phone] 2016     [place] [address]       Subscriber Name Subscriber Birth Date Member ID       ANTONIETA SERRANO 1990 [id number]                 Emergency Contacts      (Rel.) Home Phone Work Phone Mobile Phone    Mabel Serrano (Sister) -- -- [phone]    Lizbeth العلي -- -- [phone]    Rosetta Serrano (Grandparent) [phone] -- --    Zev Villalta (Significant Other) [phone] -- --               Discharge Summary      THO Lal at 2017 11:59 AM          Discharge Summary    Patient name: Antonieta Serrano  CSN: [id number]  MRN: [id number]  : 1990  Today's date: 2017     Date of Admission: 2017  Date of Discharge:  2017    Admitting Physician:  Dr. Pierce  Primary Care Provider: THO Brooks  Consultations:   Nephrology:  Dr. Gandara    Admission Diagnosis:  Hypertensive Emergency     Discharge " Diagnoses:   Hospital Problem List     * (Principal)RONALD (acute kidney injury)    Anxiety    Tobacco abuse    Diastolic dysfunction    Illicit drug use (UDS + THC)    Hypertensive emergency    Non-ST elevation myocardial infarction (NSTEMI), type 2        Allergies:  Review of patient's allergies indicates no known allergies.    Code Status:  Full Code    Procedures:  6/25/17 ECHO Left ventricular wall thickness is consistent with mild-to-moderate concentric hypertrophy. Normal left regular systolic function, ejection fraction 60%. Mild mitral ejection, trace tricuspid regurgitant. Trace aortic insufficiency.    History of Present Illness:  Patient is a 27 y.o. male with a long-standing history of high blood pressure. 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 He was admitted here previously for elevated cardiac enzymes but had near normal coronary arteries but was noted to have LVH. It sounds as if in the past he has run out of blood pressure medications and has become symptomatic.     He presented to the emergency department in [place] on the 21st simply for medication refill. He just moved there from [place] and had run out of his blood pressure medications. He was found to have an elevation of his creatinine to 2.5. He had an appointment with Dr. Herron the following day. He was given hydralazine and labetalol. Historically he has been treated with clonidine, Coreg, and lisinopril. He could not get the labetalol filled. He presented again to the emergency department early this morning with 2 days of nausea and headache. He was found to have continued elevation of his creatinine to 3.4. He had a CT of the abdomen which showed some colon wall thickening suggestive of colitis. However, the patient denies much in the way of lower bowel symptoms. He says he has had an occasional loose stool over the last several days but had no concerns in that regard. He says his symptoms of nausea and headache  "are typical of his blood pressure elevation.     He has noted no systemic symptoms such as fevers, chills, or malaise. He denies any chest pain. He was found to have red blood cells in his urine as well. He denies any urinary symptoms. His recent drug screen has been positive for THC but no other illicit substances    Hospital Course:  He was admitted into the ICU per the Intensivist service.  Nephrology was consulted. He required a cardene drip for a short period of time.  He was weaned off and placed back on home blood pressure medications.  His lisinopril was not restarted and some of his medications were adjusted. He had an ECHO with results stated above.  Urine creatinine was 77.6, total protein urine 54 and + urine eosinophils.  It was felt he may have Acute Interstitial Nephritis. He had mildly elevated troponins and his PCP may need to consider a cardiac stress test outpatient. His blood pressure is controlled.  His renal function has improved.  It is felt at this time that he is stable enough to go home.      Vitals:  BP (!) 169/117  Pulse 78  Temp 97.9 °F (36.6 °C) (Axillary)   Resp 16  Ht 69\" (175.3 cm)  Wt 165 lb 12.6 oz (75.2 kg)  SpO2 98%  BMI 24.48 kg/m2    Physical Exam:  Constitutional:  Appears well-developed and well-nourished. No distress.   HEENT:  Normocephalic and atraumatic. PERRL  Neck:  Neck supple. No JVD present.   CV: Normal rate, regular rhythm,  intact distal pulses.  No gallop, murmur or rub.  Pulmonary/Chest: Effort normal and breath sounds normal. No respiratory distress. No wheezes, rhonchi or rales.   Abdominal: Soft. +BS. No distension and no mass. There is no tenderness.   Musculoskeletal: Normal muscle tone and strength  Neurological: Alert and oriented to person, place, and time.  No focal deficits  Skin: Skin is warm and dry. No rash noted. Multiple tattoos  Extremities:  No clubbing, edema or cyanosis  Psychiatric: Normal mood and affect. Behavior is normal. "     Labs:    Results from last 7 days  Lab Units 06/26/17  0346   WBC 10*3/mm3 8.57   HEMOGLOBIN g/dL 13.3   HEMATOCRIT % 39.5   PLATELETS 10*3/mm3 250       Results from last 7 days  Lab Units 06/26/17  0346 06/25/17  0509   SODIUM mmol/L 138 141   POTASSIUM mmol/L 3.6 3.7   CHLORIDE mmol/L 103 106   TOTAL CO2 mmol/L 24.0 26.0   BUN mg/dL 38* 31*   CREATININE mg/dL 3.60* 3.70*   CALCIUM mg/dL 9.2 8.9   BILIRUBIN mg/dL  --  0.5   ALK PHOS U/L  --  52   ALT (SGPT) U/L  --  12   AST (SGOT) U/L  --  16   GLUCOSE mg/dL 83 85         Magnesium   Date Value Ref Range Status   06/26/2017 1.8 1.3 - 2.7 mg/dL Final   06/25/2017 1.7 1.3 - 2.7 mg/dL Final   06/24/2017 1.6 1.3 - 2.7 mg/dL Final     Phosphorus   Date Value Ref Range Status   06/26/2017 5.4 (H) 2.4 - 5.1 mg/dL Final   06/25/2017 4.1 2.4 - 5.1 mg/dL Final   06/24/2017 3.2 2.4 - 5.1 mg/dL Final               Discharge Medications:   Shay Serrano   Home Medication Instructions EMILE:[id number]    Printed on:06/26/17 2896   Medication Information                      amLODIPine (NORVASC) 5 MG tablet  Take 1 tablet by mouth Every 12 (Twelve) Hours.             carvedilol (COREG) 25 MG tablet  Take 1 tablet by mouth Every 12 (Twelve) Hours.             CloNIDine (CATAPRES) 0.2 MG tablet  Take 1 tablet by mouth Every 8 (Eight) Hours.             escitalopram (LEXAPRO) 10 MG tablet  Take 10 mg by mouth Daily.             nicotine (NICODERM CQ) 21 MG/24HR patch  Place 1 patch on the skin Daily.               Discharge Diet:   Diet Instructions     Diet: Regular; Thin Liquids, No Restrictions       Discharge Diet:  Regular   Fluid Consistency:  Thin Liquids, No Restrictions               Activity at Discharge:    Activity Instructions     Activity as Tolerated                   Follow-up Appointments  No future appointments.  Additional Instructions for the Follow-ups that You Need to Schedule     Discharge Follow-Up With Specified Provider    As directed    To:   [facility] [place]   Follow Up Details:  7/11/17, unknown time       Discharge Follow-up with PCP    As directed    Follow Up Details:  one week               Discharge Instructions:  Ok to go home today  Follow up as above  Scripts sent electronically  Discussed medication changes  Advised him to take BP at home 2-3 times a day and keep a log for his follow up visit.     THO Li, AGACNP-BC, FNP-BC  Pulmonary & Critical Care Medicine    Time: Discharge 45 min    CC: THO Brooks         Electronically signed by THO Lal at 6/26/2017 12:22 PM        Discharge Order     Start     Ordered    06/26/17 1158  Discharge patient  Once     Expected Discharge Date:  06/26/17    Discharge Disposition:  Home or Self Care        06/26/17 1159

## 2017-07-10 ENCOUNTER — DOCUMENTATION (OUTPATIENT)
Dept: CARDIAC REHAB | Facility: HOSPITAL | Age: 27
End: 2017-07-10

## 2017-07-10 NOTE — PROGRESS NOTES
Staff attempted to contact patient regarding Phase II Cardiac Rehab.  However, the phone number listed is no longer his current phone number.  There is no other number listed for the patient at this time.

## 2020-09-12 NOTE — PLAN OF CARE
Problem: Patient Care Overview (Adult)  Goal: Plan of Care Review  Outcome: Ongoing (interventions implemented as appropriate)    06/26/17 0513   Coping/Psychosocial Response Interventions   Plan Of Care Reviewed With patient   Patient Care Overview   Progress no change   Outcome Evaluation   Outcome Summary/Follow up Plan Pt rested well. No complaints. Good uop. Max /120 at 2100 improved with scheduled BP meds. Later in the shift, BP again giulia to 165/114, but improved with rest.          Problem: Hypertensive Disease/Crisis (Arterial) (Adult)  Goal: Signs and Symptoms of Listed Potential Problems Will be Absent or Manageable (Hypertensive Disease/Crisis)  Outcome: Ongoing (interventions implemented as appropriate)    Problem: Renal Failure/Kidney Injury, Acute (Adult)  Goal: Signs and Symptoms of Listed Potential Problems Will be Absent or Manageable (Renal Failure/Kidney Injury, Acute)  Outcome: Ongoing (interventions implemented as appropriate)       [facility]